# Patient Record
Sex: FEMALE | Race: WHITE | HISPANIC OR LATINO | Employment: UNEMPLOYED | URBAN - METROPOLITAN AREA
[De-identification: names, ages, dates, MRNs, and addresses within clinical notes are randomized per-mention and may not be internally consistent; named-entity substitution may affect disease eponyms.]

---

## 2020-11-30 ENCOUNTER — OFFICE VISIT (OUTPATIENT)
Dept: URGENT CARE | Facility: MEDICAL CENTER | Age: 25
End: 2020-11-30
Payer: COMMERCIAL

## 2020-11-30 VITALS — HEART RATE: 78 BPM | TEMPERATURE: 96.6 F | RESPIRATION RATE: 18 BRPM | OXYGEN SATURATION: 97 %

## 2020-11-30 DIAGNOSIS — B34.9 VIRAL SYNDROME: ICD-10-CM

## 2020-11-30 DIAGNOSIS — Z20.822 ENCOUNTER FOR LABORATORY TESTING FOR COVID-19 VIRUS: Primary | ICD-10-CM

## 2020-11-30 PROCEDURE — 87637 SARSCOV2&INF A&B&RSV AMP PRB: CPT | Performed by: PHYSICIAN ASSISTANT

## 2020-11-30 PROCEDURE — 99204 OFFICE O/P NEW MOD 45 MIN: CPT | Performed by: PHYSICIAN ASSISTANT

## 2020-11-30 PROCEDURE — G0383 LEV 4 HOSP TYPE B ED VISIT: HCPCS | Performed by: PHYSICIAN ASSISTANT

## 2020-11-30 PROCEDURE — 99284 EMERGENCY DEPT VISIT MOD MDM: CPT | Performed by: PHYSICIAN ASSISTANT

## 2020-12-02 LAB
FLUAV RNA NPH QL NAA+PROBE: NOT DETECTED
FLUBV RNA NPH QL NAA+PROBE: NOT DETECTED
RSV RNA NPH QL NAA+PROBE: NOT DETECTED
SARS-COV-2 RNA NPH QL NAA+PROBE: NOT DETECTED

## 2020-12-03 ENCOUNTER — TELEPHONE (OUTPATIENT)
Dept: URGENT CARE | Facility: MEDICAL CENTER | Age: 25
End: 2020-12-03

## 2021-09-23 ENCOUNTER — HOSPITAL ENCOUNTER (EMERGENCY)
Facility: HOSPITAL | Age: 26
Discharge: HOME | End: 2021-09-23
Attending: EMERGENCY MEDICINE | Admitting: EMERGENCY MEDICINE
Payer: COMMERCIAL

## 2021-09-23 ENCOUNTER — APPOINTMENT (EMERGENCY)
Dept: ULTRASOUND IMAGING | Facility: HOSPITAL | Age: 26
End: 2021-09-23
Payer: COMMERCIAL

## 2021-09-23 VITALS
SYSTOLIC BLOOD PRESSURE: 103 MMHG | RESPIRATION RATE: 16 BRPM | HEART RATE: 66 BPM | OXYGEN SATURATION: 100 % | DIASTOLIC BLOOD PRESSURE: 65 MMHG | WEIGHT: 110.89 LBS | TEMPERATURE: 98.3 F

## 2021-09-23 DIAGNOSIS — N93.9 VAGINAL BLEEDING: ICD-10-CM

## 2021-09-23 DIAGNOSIS — Z87.59 HISTORY OF MISCARRIAGE: ICD-10-CM

## 2021-09-23 DIAGNOSIS — R10.2 PELVIC PAIN: Primary | ICD-10-CM

## 2021-09-23 LAB
ABO GROUP BLD: NORMAL
ANION GAP SERPL CALCULATED.3IONS-SCNC: 8 MMOL/L (ref 4–13)
B-HCG SERPL-ACNC: <2 MIU/ML
BASOPHILS # BLD AUTO: 0.04 THOUSANDS/ΜL (ref 0–0.1)
BASOPHILS NFR BLD AUTO: 1 % (ref 0–1)
BILIRUB UR QL STRIP: NEGATIVE
BILIRUB UR QL STRIP: NEGATIVE
BUN SERPL-MCNC: 8 MG/DL (ref 5–25)
CALCIUM SERPL-MCNC: 8.8 MG/DL (ref 8.3–10.1)
CHLORIDE SERPL-SCNC: 105 MMOL/L (ref 100–108)
CLARITY UR: CLEAR
CLARITY UR: NORMAL
CO2 SERPL-SCNC: 25 MMOL/L (ref 21–32)
COLOR UR: YELLOW
COLOR UR: YELLOW
CREAT SERPL-MCNC: 0.67 MG/DL (ref 0.6–1.3)
EOSINOPHIL # BLD AUTO: 0.26 THOUSAND/ΜL (ref 0–0.61)
EOSINOPHIL NFR BLD AUTO: 4 % (ref 0–6)
ERYTHROCYTE [DISTWIDTH] IN BLOOD BY AUTOMATED COUNT: 12.4 % (ref 11.6–15.1)
EXT PREG TEST URINE: NEGATIVE
EXT. CONTROL ED NAV: NORMAL
GFR SERPL CREATININE-BSD FRML MDRD: 123 ML/MIN/1.73SQ M
GLUCOSE SERPL-MCNC: 74 MG/DL (ref 65–140)
GLUCOSE UR STRIP-MCNC: NEGATIVE MG/DL
GLUCOSE UR STRIP-MCNC: NEGATIVE MG/DL
HCT VFR BLD AUTO: 37.8 % (ref 34.8–46.1)
HGB BLD-MCNC: 12.6 G/DL (ref 11.5–15.4)
HGB UR QL STRIP.AUTO: NEGATIVE
HGB UR QL STRIP.AUTO: NEGATIVE
IMM GRANULOCYTES # BLD AUTO: 0.02 THOUSAND/UL (ref 0–0.2)
IMM GRANULOCYTES NFR BLD AUTO: 0 % (ref 0–2)
KETONES UR STRIP-MCNC: ABNORMAL MG/DL
KETONES UR STRIP-MCNC: NEGATIVE MG/DL
LEUKOCYTE ESTERASE UR QL STRIP: NEGATIVE
LEUKOCYTE ESTERASE UR QL STRIP: NEGATIVE
LYMPHOCYTES # BLD AUTO: 1.44 THOUSANDS/ΜL (ref 0.6–4.47)
LYMPHOCYTES NFR BLD AUTO: 22 % (ref 14–44)
MCH RBC QN AUTO: 29.4 PG (ref 26.8–34.3)
MCHC RBC AUTO-ENTMCNC: 33.3 G/DL (ref 31.4–37.4)
MCV RBC AUTO: 88 FL (ref 82–98)
MONOCYTES # BLD AUTO: 0.52 THOUSAND/ΜL (ref 0.17–1.22)
MONOCYTES NFR BLD AUTO: 8 % (ref 4–12)
NEUTROPHILS # BLD AUTO: 4.26 THOUSANDS/ΜL (ref 1.85–7.62)
NEUTS SEG NFR BLD AUTO: 65 % (ref 43–75)
NITRITE UR QL STRIP: NEGATIVE
NITRITE UR QL STRIP: NEGATIVE
NRBC BLD AUTO-RTO: 0 /100 WBCS
PH UR STRIP.AUTO: 6 [PH] (ref 4.5–8)
PH UR STRIP.AUTO: 7.5 [PH]
PLATELET # BLD AUTO: 217 THOUSANDS/UL (ref 149–390)
PMV BLD AUTO: 10.9 FL (ref 8.9–12.7)
POTASSIUM SERPL-SCNC: 4.2 MMOL/L (ref 3.5–5.3)
PROT UR STRIP-MCNC: NEGATIVE MG/DL
PROT UR STRIP-MCNC: NEGATIVE MG/DL
RBC # BLD AUTO: 4.28 MILLION/UL (ref 3.81–5.12)
RH BLD: POSITIVE
SODIUM SERPL-SCNC: 138 MMOL/L (ref 136–145)
SP GR UR STRIP.AUTO: 1.02 (ref 1–1.03)
SP GR UR STRIP.AUTO: 1.02 (ref 1–1.03)
UROBILINOGEN UR QL STRIP.AUTO: 0.2 E.U./DL
UROBILINOGEN UR QL STRIP.AUTO: 0.2 E.U./DL
WBC # BLD AUTO: 6.54 THOUSAND/UL (ref 4.31–10.16)

## 2021-09-23 PROCEDURE — 85025 COMPLETE CBC W/AUTO DIFF WBC: CPT | Performed by: PHYSICIAN ASSISTANT

## 2021-09-23 PROCEDURE — 86901 BLOOD TYPING SEROLOGIC RH(D): CPT | Performed by: PHYSICIAN ASSISTANT

## 2021-09-23 PROCEDURE — 99284 EMERGENCY DEPT VISIT MOD MDM: CPT

## 2021-09-23 PROCEDURE — 84702 CHORIONIC GONADOTROPIN TEST: CPT | Performed by: PHYSICIAN ASSISTANT

## 2021-09-23 PROCEDURE — 81003 URINALYSIS AUTO W/O SCOPE: CPT | Performed by: PHYSICIAN ASSISTANT

## 2021-09-23 PROCEDURE — 80048 BASIC METABOLIC PNL TOTAL CA: CPT | Performed by: PHYSICIAN ASSISTANT

## 2021-09-23 PROCEDURE — 87086 URINE CULTURE/COLONY COUNT: CPT

## 2021-09-23 PROCEDURE — 99285 EMERGENCY DEPT VISIT HI MDM: CPT | Performed by: PHYSICIAN ASSISTANT

## 2021-09-23 PROCEDURE — 81003 URINALYSIS AUTO W/O SCOPE: CPT

## 2021-09-23 PROCEDURE — 76856 US EXAM PELVIC COMPLETE: CPT

## 2021-09-23 PROCEDURE — 86900 BLOOD TYPING SEROLOGIC ABO: CPT | Performed by: PHYSICIAN ASSISTANT

## 2021-09-23 PROCEDURE — 81025 URINE PREGNANCY TEST: CPT | Performed by: EMERGENCY MEDICINE

## 2021-09-23 PROCEDURE — 87086 URINE CULTURE/COLONY COUNT: CPT | Performed by: PHYSICIAN ASSISTANT

## 2021-09-23 PROCEDURE — 76830 TRANSVAGINAL US NON-OB: CPT

## 2021-09-23 PROCEDURE — 36415 COLL VENOUS BLD VENIPUNCTURE: CPT | Performed by: PHYSICIAN ASSISTANT

## 2021-09-23 NOTE — ED PROVIDER NOTES
History  Chief Complaint   Patient presents with    Abdominal Pain Pregnant     pt reports 2 positive pregnancy tests at home  started with lower abdominal pain and bleeding   had a miscarriage 6 months ago so concerned for same  Patient is a 52-year-old female presenting to the ED for evaluation of lower abdominal cramping and possible pregnancy  Patient took 2 home pregnancy tests last Friday that report positive  Yesterday she started having lower abdominal cramping primarily over left lower quadrant and suprapubic region  She went to the bathroom and passed a large clot that she reports looked like products of conception  She had a small amount of bleeding after this has not had any vaginal bleeding since that time  She is still having pelvic pain  She states that she had a miscarriage 6 months ago and is concerned that she is having another  She denies fevers, chills, nausea, vomiting, chest pain, sob, diarrhea, constipation or urinary symptoms  No vaginal discharge, pain or lesions  LMP was mid to end of July  None       History reviewed  No pertinent past medical history  History reviewed  No pertinent surgical history  History reviewed  No pertinent family history  I have reviewed and agree with the history as documented  E-Cigarette/Vaping     E-Cigarette/Vaping Substances     Social History     Tobacco Use    Smoking status: Never Smoker    Smokeless tobacco: Never Used   Substance Use Topics    Alcohol use: Not Currently    Drug use: Never       Review of Systems   Constitutional: Negative for appetite change, chills, fatigue and fever  HENT: Negative for congestion, rhinorrhea, sinus pressure, sinus pain and sore throat  Eyes: Negative for photophobia and visual disturbance  Respiratory: Negative for cough, shortness of breath and wheezing  Cardiovascular: Negative for chest pain, palpitations and leg swelling     Gastrointestinal: Negative for abdominal pain, blood in stool, constipation, diarrhea, nausea and vomiting  Genitourinary: Positive for pelvic pain and vaginal bleeding  Negative for difficulty urinating, dysuria, flank pain, frequency, hematuria and urgency  Musculoskeletal: Negative for arthralgias, back pain, joint swelling, myalgias and neck pain  Neurological: Negative for dizziness, syncope, weakness, light-headedness and headaches  All other systems reviewed and are negative  Physical Exam  Physical Exam  Vitals and nursing note reviewed  Constitutional:       General: She is awake  Appearance: Normal appearance  She is well-developed  She is not toxic-appearing or diaphoretic  HENT:      Head: Normocephalic and atraumatic  Right Ear: External ear normal       Left Ear: External ear normal       Nose: Nose normal       Mouth/Throat:      Lips: Pink  Mouth: Mucous membranes are moist    Eyes:      General: Lids are normal  No scleral icterus  Conjunctiva/sclera: Conjunctivae normal       Pupils: Pupils are equal, round, and reactive to light  Cardiovascular:      Rate and Rhythm: Normal rate and regular rhythm  Pulses: Normal pulses  Radial pulses are 2+ on the right side and 2+ on the left side  Heart sounds: Normal heart sounds, S1 normal and S2 normal    Pulmonary:      Effort: Pulmonary effort is normal  No accessory muscle usage  Breath sounds: Normal breath sounds  No stridor  No decreased breath sounds, wheezing, rhonchi or rales  Abdominal:      General: Abdomen is flat  Bowel sounds are normal  There is no distension  Palpations: Abdomen is soft  Tenderness: There is abdominal tenderness in the suprapubic area and left lower quadrant  There is no right CVA tenderness, left CVA tenderness, guarding or rebound  Negative signs include Daily's sign, Rovsing's sign, McBurney's sign, psoas sign and obturator sign     Musculoskeletal:      Cervical back: Full passive range of motion without pain and neck supple  No signs of trauma  No pain with movement  Right lower leg: No edema  Left lower leg: No edema  Lymphadenopathy:      Cervical: No cervical adenopathy  Skin:     General: Skin is warm and dry  Capillary Refill: Capillary refill takes less than 2 seconds  Coloration: Skin is not cyanotic, jaundiced or pale  Neurological:      Mental Status: She is alert and oriented to person, place, and time  GCS: GCS eye subscore is 4  GCS verbal subscore is 5  GCS motor subscore is 6  Gait: Gait normal    Psychiatric:         Mood and Affect: Mood normal          Speech: Speech normal          Behavior: Behavior is cooperative           Vital Signs  ED Triage Vitals [09/23/21 1009]   Temperature Pulse Respirations Blood Pressure SpO2   98 3 °F (36 8 °C) 87 16 122/63 99 %      Temp Source Heart Rate Source Patient Position - Orthostatic VS BP Location FiO2 (%)   Oral -- -- -- --      Pain Score       Worst Possible Pain           Vitals:    09/23/21 1009 09/23/21 1316   BP: 122/63 103/65   Pulse: 87 66         Visual Acuity      ED Medications  Medications - No data to display    Diagnostic Studies  Results Reviewed     Procedure Component Value Units Date/Time    Urine culture [[id number]] Collected: 09/23/21 1140    Lab Status: Final result Specimen: Urine, Clean Catch Updated: 09/24/21 1156     Urine Culture No Growth <1000 cfu/mL    Urine culture [[id number]] Collected: 09/23/21 1100    Lab Status: Final result Specimen: Urine, Clean Catch Updated: 09/24/21 1156     Urine Culture No Growth <1000 cfu/mL    UA w Reflex to Microscopic w Reflex to Culture [[id number]] Collected: 09/23/21 1140    Lab Status: Final result Specimen: Urine, Clean Catch Updated: 09/23/21 1208     Color, UA Yellow     Clarity, UA Cloudy     Specific Dawn, UA 1 020     pH, UA 7 5     Leukocytes, UA Negative     Nitrite, UA Negative     Protein, UA Negative mg/dl      Glucose, UA Negative mg/dl      Ketones, UA Negative mg/dl      Urobilinogen, UA 0 2 E U /dl      Bilirubin, UA Negative     Blood, UA Negative     URINE COMMENT --    Basic metabolic panel [[id number]] Collected: 09/23/21 1140    Lab Status: Final result Specimen: Blood from Arm, Right Updated: 09/23/21 1207     Sodium 138 mmol/L      Potassium 4 2 mmol/L      Chloride 105 mmol/L      CO2 25 mmol/L      ANION GAP 8 mmol/L      BUN 8 mg/dL      Creatinine 0 67 mg/dL      Glucose 74 mg/dL      Calcium 8 8 mg/dL      eGFR 123 ml/min/1 73sq m     Narrative:      [facility] guidelines for Chronic Kidney Disease (CKD):     Stage 1 with normal or high GFR (GFR > 90 mL/min/1 73 square meters)    Stage 2 Mild CKD (GFR = 60-89 mL/min/1 73 square meters)    Stage 3A Moderate CKD (GFR = 45-59 mL/min/1 73 square meters)    Stage 3B Moderate CKD (GFR = 30-44 mL/min/1 73 square meters)    Stage 4 Severe CKD (GFR = 15-29 mL/min/1 73 square meters)    Stage 5 End Stage CKD (GFR <15 mL/min/1 73 square meters)  Note: GFR calculation is accurate only with a steady state creatinine    hCG, quantitative [[id number]]  (Normal) Collected: 09/23/21 1140    Lab Status: Final result Specimen: Blood from Arm, Right Updated: 09/23/21 1207     HCG, Quant <2 mIU/mL     Narrative:       Expected Ranges:     Approximate               Approximate HCG  Gestation age          Concentration ( mIU/mL)  _____________          ______________________   Dorothy Philip                      HCG values  0 2-1                       5-50  1-2                           2-3                         100-5000  3-4                         500-87215  4-5                         1000-49499  5-6                         [id number]  6-8                         [id number]  8-12                        [id number]      CBC and differential [[id number]] Collected: 09/23/21 1140    Lab Status: Final result Specimen: Blood from Arm, Right Updated: 09/23/21 1147 WBC 6 54 Thousand/uL      RBC 4 28 Million/uL      Hemoglobin 12 6 g/dL      Hematocrit 37 8 %      MCV 88 fL      MCH 29 4 pg      MCHC 33 3 g/dL      RDW 12 4 %      MPV 10 9 fL      Platelets 142 Thousands/uL      nRBC 0 /100 WBCs      Neutrophils Relative 65 %      Immat GRANS % 0 %      Lymphocytes Relative 22 %      Monocytes Relative 8 %      Eosinophils Relative 4 %      Basophils Relative 1 %      Neutrophils Absolute 4 26 Thousands/µL      Immature Grans Absolute 0 02 Thousand/uL      Lymphocytes Absolute 1 44 Thousands/µL      Monocytes Absolute 0 52 Thousand/µL      Eosinophils Absolute 0 26 Thousand/µL      Basophils Absolute 0 04 Thousands/µL     POCT pregnancy, urine [[id number]]  (Normal) Resulted: 09/23/21 1107    Lab Status: Final result Updated: 09/23/21 1107     EXT PREG TEST UR (Ref: Negative) NEGATIVE     Control VALID    Urine Macroscopic, POC [[id number]]  (Abnormal) Collected: 09/23/21 1100    Lab Status: Final result Specimen: Urine Updated: 09/23/21 1103     Color, UA Yellow     Clarity, UA Clear     pH, UA 6 0     Leukocytes, UA Negative     Nitrite, UA Negative     Protein, UA Negative mg/dl      Glucose, UA Negative mg/dl      Ketones, UA Trace mg/dl      Urobilinogen, UA 0 2 E U /dl      Bilirubin, UA Negative     Blood, UA Negative     Specific Gravity, UA 1 025    Narrative:      CLINITEK RESULT                 US pelvis complete w transvaginal   Final Result by Lisa Liu MD (09/23 1325)       No IUP  No suspicious adnexal masses  Small amount of free fluid  Corpus luteum on the right  Workstation performed: [id number]                    Procedures  Procedures         ED Course                             SBIRT 22yo+      Most Recent Value   SBIRT (25 yo +)   In order to provide better care to our patients, we are screening all of our patients for alcohol and drug use  Would it be okay to ask you these screening questions?   Yes Filed at: 09/23/2021 1054   Initial Alcohol Screen: US AUDIT-C    1  How often do you have a drink containing alcohol?  0 Filed at: 09/23/2021 1054   2  How many drinks containing alcohol do you have on a typical day you are drinking? 0 Filed at: 09/23/2021 1054   3b  FEMALE Any Age, or MALE 65+: How often do you have 4 or more drinks on one occassion? 0 Filed at: 09/23/2021 1054   Audit-C Score  0 Filed at: 09/23/2021 1054   TATA: How many times in the past year have you    Used an illegal drug or used a prescription medication for non-medical reasons? Never Filed at: 09/23/2021 1054                    MDM  Number of Diagnoses or Management Options  History of miscarriage  Pelvic pain  Vaginal bleeding  Diagnosis management comments: Patient is a 67-year-old female presenting to the ED for evaluation of lower abdominal cramping and possible pregnancy  Negative pregnancy test in ED  Patient showed a photo of the large clot that she passed and it did appear to be possible products of conception  Pelvic U/S normal; no torsion  Labs unremarkable  Advised patient to follow-up with her OBGYN and/or return to ED for worsening symptoms  The management plan was discussed in detail with the patient at bedside and all questions were answered  Prior to discharge, verbal and written instructions provided  ED return precautions discussed in detail  The patient verbalized understanding of our discussion and plan of care, and agrees to return to the Emergency Department for concerns and progression of illness         Amount and/or Complexity of Data Reviewed  Clinical lab tests: ordered and reviewed  Tests in the radiology section of CPT®: ordered and reviewed    Patient Progress  Patient progress: stable      Disposition  Final diagnoses:   Pelvic pain   Vaginal bleeding   History of miscarriage     Time reflects when diagnosis was documented in both MDM as applicable and the Disposition within this note     Time User Action Codes Description Comment    9/23/2021  1:32 PM Reina Hayden Add [R10 2] Pelvic pain     9/23/2021  1:32 PM Reina Hayden Add [N93 9] Vaginal bleeding     9/23/2021  1:33 PM Reina Hayden Add [Z87 59] Hx of one miscarriage     9/23/2021  1:33 PM Reina Hayden Remove [Z87 59] Hx of one miscarriage     9/23/2021  1:33 PM Asha Green Add [Z87 59] History of miscarriage       ED Disposition     ED Disposition Condition Date/Time Comment    Discharge Stable Thu Sep 23, 2021  1:32 PM Anca Lester discharge to home/self care  Follow-up Information     Follow up With Specialties Details Why Contact Info Additional [facility] Schedule an appointment as soon as possible for a visit   [address]  [id number] [place] [address], [place], [place], [address]   [facility] Emergency Department Emergency Medicine  If symptoms worsen [facility] [id number]  [address] [place] [address] Emergency Department, [address] [place] [address], [id number]          There are no discharge medications for this patient          PDMP Review     None          ED Provider  Electronically Signed by           Dot Ngo PA-C  09/24/21 2039

## 2021-09-24 LAB
BACTERIA UR CULT: NORMAL
BACTERIA UR CULT: NORMAL

## 2021-10-21 ENCOUNTER — HOSPITAL ENCOUNTER (EMERGENCY)
Facility: HOSPITAL | Age: 26
Discharge: HOME | End: 2021-10-21
Attending: EMERGENCY MEDICINE | Admitting: EMERGENCY MEDICINE
Payer: COMMERCIAL

## 2021-10-21 VITALS
OXYGEN SATURATION: 100 % | SYSTOLIC BLOOD PRESSURE: 106 MMHG | DIASTOLIC BLOOD PRESSURE: 69 MMHG | RESPIRATION RATE: 18 BRPM | HEART RATE: 78 BPM | WEIGHT: 110 LBS | TEMPERATURE: 97.1 F

## 2021-10-21 DIAGNOSIS — O21.9 VOMITING AFFECTING PREGNANCY: ICD-10-CM

## 2021-10-21 DIAGNOSIS — R19.7 DIARRHEA: Primary | ICD-10-CM

## 2021-10-21 PROCEDURE — 99283 EMERGENCY DEPT VISIT LOW MDM: CPT

## 2021-10-21 PROCEDURE — NC001 PR NO CHARGE: Performed by: EMERGENCY MEDICINE

## 2021-10-21 RX ORDER — ONDANSETRON 4 MG/1
4 TABLET, FILM COATED ORAL EVERY 8 HOURS PRN
Qty: 12 TABLET | Refills: 0 | Status: SHIPPED | OUTPATIENT
Start: 2021-10-21 | End: 2021-10-25

## 2021-11-29 ENCOUNTER — NURSE TRIAGE (OUTPATIENT)
Dept: OTHER | Facility: OTHER | Age: 26
End: 2021-11-29

## 2022-01-20 ENCOUNTER — HOSPITAL ENCOUNTER (EMERGENCY)
Facility: HOSPITAL | Age: 27
Discharge: HOME | End: 2022-01-20
Attending: EMERGENCY MEDICINE | Admitting: EMERGENCY MEDICINE
Payer: COMMERCIAL

## 2022-01-20 VITALS
HEART RATE: 77 BPM | WEIGHT: 110.67 LBS | OXYGEN SATURATION: 100 % | SYSTOLIC BLOOD PRESSURE: 107 MMHG | RESPIRATION RATE: 16 BRPM | DIASTOLIC BLOOD PRESSURE: 71 MMHG | TEMPERATURE: 98.3 F

## 2022-01-20 DIAGNOSIS — R53.1 GENERALIZED WEAKNESS: Primary | ICD-10-CM

## 2022-01-20 LAB
ALBUMIN SERPL BCP-MCNC: 4.6 G/DL (ref 3–5.2)
ALP SERPL-CCNC: 58 U/L (ref 43–122)
ALT SERPL W P-5'-P-CCNC: 8 U/L
ANION GAP SERPL CALCULATED.3IONS-SCNC: 8 MMOL/L (ref 5–14)
AST SERPL W P-5'-P-CCNC: 16 U/L (ref 14–36)
ATRIAL RATE: 73 BPM
BASOPHILS # BLD AUTO: 0.07 THOUSAND/UL (ref 0–0.1)
BASOPHILS NFR MAR MANUAL: 1 % (ref 0–1)
BILIRUB SERPL-MCNC: 0.44 MG/DL
BUN SERPL-MCNC: 6 MG/DL (ref 5–25)
CALCIUM SERPL-MCNC: 9.2 MG/DL (ref 8.4–10.2)
CHLORIDE SERPL-SCNC: 103 MMOL/L (ref 97–108)
CO2 SERPL-SCNC: 30 MMOL/L (ref 22–30)
CREAT SERPL-MCNC: 0.54 MG/DL (ref 0.6–1.2)
EOSINOPHIL # BLD AUTO: 0.07 THOUSAND/UL (ref 0–0.4)
EOSINOPHIL NFR BLD MANUAL: 1 % (ref 0–6)
ERYTHROCYTE [DISTWIDTH] IN BLOOD BY AUTOMATED COUNT: 13.6 %
EXT PREG TEST URINE: NORMAL
EXT. CONTROL ED NAV: NORMAL
GFR SERPL CREATININE-BSD FRML MDRD: 131 ML/MIN/1.73SQ M
GLUCOSE SERPL-MCNC: 103 MG/DL (ref 70–99)
HCT VFR BLD AUTO: 39.1 % (ref 36–46)
HGB BLD-MCNC: 12.8 G/DL (ref 12–16)
LYMPHOCYTES # BLD AUTO: 2 THOUSAND/UL (ref 0.5–4)
LYMPHOCYTES # BLD AUTO: 29 % (ref 25–45)
MCH RBC QN AUTO: 28 PG (ref 26–34)
MCHC RBC AUTO-ENTMCNC: 32.7 G/DL (ref 31–36)
MCV RBC AUTO: 86 FL (ref 80–100)
MONOCYTES # BLD AUTO: 0.28 THOUSAND/UL (ref 0.2–0.9)
MONOCYTES NFR BLD AUTO: 4 % (ref 1–10)
MYELOCYTES NFR BLD MANUAL: 3 % (ref 0–1)
NEUTS BAND NFR BLD MANUAL: 2 % (ref 0–8)
NEUTS SEG # BLD: 4.28 THOUSAND/UL (ref 1.8–7.8)
NEUTS SEG NFR BLD AUTO: 60 %
P AXIS: 55 DEGREES
PLATELET # BLD AUTO: 234 THOUSANDS/UL (ref 150–450)
PLATELET BLD QL SMEAR: ADEQUATE
PMV BLD AUTO: 9.6 FL (ref 8.9–12.7)
POTASSIUM SERPL-SCNC: 3.6 MMOL/L (ref 3.6–5)
PR INTERVAL: 130 MS
PROT SERPL-MCNC: 8.6 G/DL (ref 5.9–8.4)
QRS AXIS: 79 DEGREES
QRSD INTERVAL: 82 MS
QT INTERVAL: 382 MS
QTC INTERVAL: 420 MS
RBC # BLD AUTO: 4.58 MILLION/UL (ref 4–5.2)
RBC MORPH BLD: NORMAL
SODIUM SERPL-SCNC: 141 MMOL/L (ref 137–147)
T WAVE AXIS: 72 DEGREES
TOTAL CELLS COUNTED SPEC: 100
VENTRICULAR RATE: 73 BPM
WBC # BLD AUTO: 6.9 THOUSAND/UL (ref 4.5–11)

## 2022-01-20 PROCEDURE — 99285 EMERGENCY DEPT VISIT HI MDM: CPT | Performed by: PHYSICIAN ASSISTANT

## 2022-01-20 PROCEDURE — 93005 ELECTROCARDIOGRAM TRACING: CPT

## 2022-01-20 PROCEDURE — 81025 URINE PREGNANCY TEST: CPT | Performed by: PHYSICIAN ASSISTANT

## 2022-01-20 PROCEDURE — 96361 HYDRATE IV INFUSION ADD-ON: CPT

## 2022-01-20 PROCEDURE — 99284 EMERGENCY DEPT VISIT MOD MDM: CPT

## 2022-01-20 PROCEDURE — 85007 BL SMEAR W/DIFF WBC COUNT: CPT | Performed by: PHYSICIAN ASSISTANT

## 2022-01-20 PROCEDURE — 80053 COMPREHEN METABOLIC PANEL: CPT | Performed by: PHYSICIAN ASSISTANT

## 2022-01-20 PROCEDURE — 85027 COMPLETE CBC AUTOMATED: CPT | Performed by: PHYSICIAN ASSISTANT

## 2022-01-20 PROCEDURE — 93010 ELECTROCARDIOGRAM REPORT: CPT | Performed by: INTERNAL MEDICINE

## 2022-01-20 PROCEDURE — 96374 THER/PROPH/DIAG INJ IV PUSH: CPT

## 2022-01-20 PROCEDURE — 96375 TX/PRO/DX INJ NEW DRUG ADDON: CPT

## 2022-01-20 PROCEDURE — 36415 COLL VENOUS BLD VENIPUNCTURE: CPT | Performed by: PHYSICIAN ASSISTANT

## 2022-01-20 RX ORDER — KETOROLAC TROMETHAMINE 30 MG/ML
15 INJECTION, SOLUTION INTRAMUSCULAR; INTRAVENOUS ONCE
Status: COMPLETED | OUTPATIENT
Start: 2022-01-20 | End: 2022-01-20

## 2022-01-20 RX ORDER — LORAZEPAM 2 MG/ML
0.5 INJECTION INTRAMUSCULAR ONCE
Status: COMPLETED | OUTPATIENT
Start: 2022-01-20 | End: 2022-01-20

## 2022-01-20 RX ADMIN — LORAZEPAM 0.5 MG: 2 INJECTION INTRAMUSCULAR; INTRAVENOUS at 20:43

## 2022-01-20 RX ADMIN — KETOROLAC TROMETHAMINE 15 MG: 30 INJECTION, SOLUTION INTRAMUSCULAR; INTRAVENOUS at 20:43

## 2022-01-20 RX ADMIN — SODIUM CHLORIDE 1000 ML: 0.9 INJECTION, SOLUTION INTRAVENOUS at 20:42

## 2022-01-21 NOTE — ED PROVIDER NOTES
History  Chief Complaint   Patient presents with    Anxiety     Anxiety/panic attack and potential syncopal episode today   Syncope     30-year-old female without significant past medical history presents via EMS complaining of generalized weakness  Patient tells me that she ate dinner and was feeling her normal self and then went to bed began experiencing palpitations generalized weakness and felt like she was going to faint  Denies specific syncopal episode but felt like she was very weak  Denies chest pain but admits palpitations  Denies SOB  Admits to a lot of recent anxiety  Denies any other complaints       History provided by:  Patient   used: No        Prior to Admission Medications   Prescriptions Last Dose Informant Patient Reported? Taking?   ondansetron (ZOFRAN) 4 mg tablet   No No   Sig: Take 1 tablet (4 mg total) by mouth every 8 (eight) hours as needed for nausea or vomiting for up to 4 days      Facility-Administered Medications: None       History reviewed  No pertinent past medical history  History reviewed  No pertinent surgical history  History reviewed  No pertinent family history  I have reviewed and agree with the history as documented  E-Cigarette/Vaping     E-Cigarette/Vaping Substances     Social History     Tobacco Use    Smoking status: Never Smoker    Smokeless tobacco: Never Used   Substance Use Topics    Alcohol use: Not Currently    Drug use: Never       Review of Systems   Constitutional: Negative  Negative for chills and fatigue  Generalized weakness   HENT: Negative for ear pain and sore throat  Eyes: Negative for photophobia and redness  Respiratory: Negative for apnea, cough and shortness of breath  Cardiovascular: Positive for palpitations  Negative for chest pain  Gastrointestinal: Negative for abdominal pain, nausea and vomiting  Genitourinary: Negative for dysuria     Musculoskeletal: Negative for arthralgias, neck pain and neck stiffness  Skin: Negative for rash  Neurological: Negative for dizziness, tremors, syncope and weakness  Psychiatric/Behavioral: Negative for suicidal ideas  The patient is nervous/anxious  Physical Exam  Physical Exam  Constitutional:       General: She is not in acute distress  Appearance: She is well-developed  She is not ill-appearing, toxic-appearing or diaphoretic  Eyes:      Pupils: Pupils are equal, round, and reactive to light  Cardiovascular:      Rate and Rhythm: Normal rate and regular rhythm  Pulmonary:      Effort: Pulmonary effort is normal  No respiratory distress  Breath sounds: Normal breath sounds  Abdominal:      General: Bowel sounds are normal  There is no distension  Palpations: Abdomen is soft  Musculoskeletal:         General: Normal range of motion  Cervical back: Normal range of motion and neck supple  Skin:     General: Skin is warm and dry  Neurological:      General: No focal deficit present  Mental Status: She is alert and oriented to person, place, and time  Cranial Nerves: No cranial nerve deficit           Vital Signs  ED Triage Vitals [01/20/22 2018]   Temperature Pulse Respirations Blood Pressure SpO2   98 3 °F (36 8 °C) 86 16 121/71 100 %      Temp Source Heart Rate Source Patient Position - Orthostatic VS BP Location FiO2 (%)   Oral Monitor Lying Left arm --      Pain Score       --           Vitals:    01/20/22 2018 01/20/22 2118   BP: 121/71 107/71   Pulse: 86 77   Patient Position - Orthostatic VS: Lying Lying         Visual Acuity      ED Medications  Medications   ketorolac (TORADOL) injection 15 mg (15 mg Intravenous Given 1/20/22 2043)   sodium chloride 0 9 % bolus 1,000 mL (0 mL Intravenous Stopped 1/20/22 2129)   LORazepam (ATIVAN) injection 0 5 mg (0 5 mg Intravenous Given 1/20/22 2043)       Diagnostic Studies  Results Reviewed     Procedure Component Value Units Date/Time    Manual Differential (Non [facility]) [[id number]]  (Abnormal) Collected: 01/20/22 2042    Lab Status: Final result Specimen: Blood from Arm, Right Updated: 01/20/22 2128     Segmented % 60 %      Bands % 2 %      Lymphocytes % 29 %      Monocytes % 4 %      Eosinophils, % 1 %      Basophils % 1 %      Myelocytes % 3 %      Neutrophils Absolute 4 28 Thousand/uL      Lymphocytes Absolute 2 00 Thousand/uL      Monocytes Absolute 0 28 Thousand/uL      Eosinophils Absolute 0 07 Thousand/uL      Basophils Absolute 0 07 Thousand/uL      Total Counted 100     RBC Morphology Normal     Platelet Estimate Adequate    Comprehensive metabolic panel [[id number]]  (Abnormal) Collected: 01/20/22 2042    Lab Status: Final result Specimen: Blood from Arm, Right Updated: 01/20/22 2101     Sodium 141 mmol/L      Potassium 3 6 mmol/L      Chloride 103 mmol/L      CO2 30 mmol/L      ANION GAP 8 mmol/L      BUN 6 mg/dL      Creatinine 0 54 mg/dL      Glucose 103 mg/dL      Calcium 9 2 mg/dL      AST 16 U/L      ALT 8 U/L      Alkaline Phosphatase 58 U/L      Total Protein 8 6 g/dL      Albumin 4 6 g/dL      Total Bilirubin 0 44 mg/dL      eGFR 131 ml/min/1 73sq m     Narrative:      [facility] guidelines for Chronic Kidney Disease (CKD):     Stage 1 with normal or high GFR (GFR > 90 mL/min/1 73 square meters)    Stage 2 Mild CKD (GFR = 60-89 mL/min/1 73 square meters)    Stage 3A Moderate CKD (GFR = 45-59 mL/min/1 73 square meters)    Stage 3B Moderate CKD (GFR = 30-44 mL/min/1 73 square meters)    Stage 4 Severe CKD (GFR = 15-29 mL/min/1 73 square meters)    Stage 5 End Stage CKD (GFR <15 mL/min/1 73 square meters)  Note: GFR calculation is accurate only with a steady state creatinine    CBC and differential [[id number]]  (Normal) Collected: 01/20/22 2042    Lab Status: Final result Specimen: Blood from Arm, Right Updated: 01/20/22 2054     WBC 6 90 Thousand/uL      RBC 4 58 Million/uL      Hemoglobin 12 8 g/dL      Hematocrit 39 1 %      MCV 86 fL      MCH 28 0 pg      MCHC 32 7 g/dL      RDW 13 6 %      MPV 9 6 fL      Platelets 191 Thousands/uL     POCT pregnancy, urine [[id number]]  (Normal) Resulted: 01/20/22 2038    Lab Status: Final result Updated: 01/20/22 2038     EXT PREG TEST UR (Ref: Negative) neg  Control valid                 No orders to display              Procedures  ECG 12 Lead Documentation Only    Date/Time: 1/20/2022 8:38 PM  Performed by: Gary Adams PA-C  Authorized by: Gary Adams PA-C     Indications / Diagnosis:  Weakness  ECG reviewed by me, the ED Provider: no    Patient location:  ED  Previous ECG:     Comparison to cardiac monitor: Yes    Interpretation:     Interpretation: normal    Rate:     ECG rate:  73    ECG rate assessment: normal    Rhythm:     Rhythm: sinus rhythm    Ectopy:     Ectopy: none    QRS:     QRS axis:  Normal    QRS intervals:  Normal  Conduction:     Conduction: normal    ST segments:     ST segments:  Normal  T waves:     T waves: normal               ED Course                                             MDM  Number of Diagnoses or Management Options  Generalized weakness: new and does not require workup  Diagnosis management comments: Patient had benign evaluation was feeling better following IV hydration  No focal abnormality on laboratory evaluation or physical exam   Symptoms thought to be related to dehydration versus orthostatic hypotension  Patient was educated extensively on supportive care and return precautions and stable for discharge home         Amount and/or Complexity of Data Reviewed  Clinical lab tests: ordered and reviewed    Risk of Complications, Morbidity, and/or Mortality  Presenting problems: moderate  Diagnostic procedures: moderate  Management options: moderate    Patient Progress  Patient progress: stable      Disposition  Final diagnoses:   Generalized weakness     Time reflects when diagnosis was documented in both MDM as applicable and the Disposition within this note     Time User Action Codes Description Comment    1/20/2022  9:11 PM Mina Singer Add [R55] Near syncope     1/20/2022  9:12 PM Mina Singer Remove [R55] Near syncope     1/20/2022  9:13 PM Mina Singer Add [R53 1] Generalized weakness       ED Disposition     ED Disposition Condition Date/Time Comment    Discharge Stable Thu Jan 20, 2022  9:11 PM Kimmy Sánchez discharge to home/self care  Follow-up Information     Follow up With Specialties Details Why Contact Info Additional [address] Heart Emergency Department Emergency Medicine Go to  If symptoms worsen [address] [address] [address] [facility] Heart Emergency Department          Discharge Medication List as of 1/20/2022  9:13 PM      CONTINUE these medications which have NOT CHANGED    Details   ondansetron (ZOFRAN) 4 mg tablet Take 1 tablet (4 mg total) by mouth every 8 (eight) hours as needed for nausea or vomiting for up to 4 days, Starting Thu 10/21/2021, Until Mon 10/25/2021 at 2359, Print             No discharge procedures on file      PDMP Review     None          ED Provider  Electronically Signed by           Shankar Lovelace PA-C  01/20/22 4520

## 2022-02-15 ENCOUNTER — TELEPHONE (OUTPATIENT)
Dept: OBGYN CLINIC | Facility: CLINIC | Age: 27
End: 2022-02-15

## 2022-02-27 NOTE — PROGRESS NOTES
Assessment/Plan:    History of seizures  No previous documentation on chart  Per patient she was diagnosed with seizures in 1 in [facility], and has not been placed on any medications  In Ed January 2022 visit , patient had no witnessed seizure and no diagnosis regarding this matter  Ddx: pseudoseizures, panick attack, worsening anxiety in the setting of recent stressors   Positive family hx with father and sister that have seizures  She's had recent labs in the ED in January, will check new CBC, BMP, Mg, TSH/freeT4 and HbA1c to ensure corinne doesn't have major electrolyte derangements, thyroid disease and diabetes  Will send referral to neurology for further evaluation and management  She will require outpatient EEG    Acute midline low back pain without sciatica  Presentation: acute midline lumbar pain  No red flag symptoms such a loss of urine/bowel  Discussed importance of maintaining daily activity and avoiding sedentary lifestyle which can worsen back pain  Advised patient to use massage, or warm shower prior to stretching  PT referral placed for strengthening core exercises and pain reduction  Additional treatment:   Lidocaine Patches, Motrin as tolerated in conjunction with Tylenol           Diagnoses and all orders for this visit:    Encounter to establish care  Comments:  Healthy lifestyle measures reinforced  Reduce stress and increase hydration daily   Tdap in 2018    History of seizures  -     Ambulatory Referral to Neurology; Future  -     TSH, 3rd generation; Future  -     Basic metabolic panel; Future  -     Magnesium; Future  -     CBC and differential; Future  -     T4, free; Future    Screening for diabetes mellitus  -     HEMOGLOBIN A1C W/ EAG ESTIMATION; Future    Screening for thyroid disorder  -     TSH, 3rd generation;  Future    Screening for HIV (human immunodeficiency virus)  -     HIV 1/2 Antigen/Antibody (4th Generation) w Reflex [facility]; Future    Encounter for hepatitis C screening test for low risk patient  -     Hepatitis C antibody; Future    Mixed headache  Comments:  Tension + migraine HA components  Discussed HA dairy  Naproxen prescribed to take with Tylenol prior to [address] onset  Coffeine intake, sleep schedule & hydration  Orders:  -     naproxen (Naprosyn) 500 mg tablet; Take 1 tablet (500 mg total) by mouth 2 (two) times a day as needed for headaches    Acute midline low back pain without sciatica  -     Ambulatory Referral to Physical Therapy; Future          Subjective:      Patient ID: Clive Chan is a 23 y o  female  HPI       Patient presents today to establish care  PMH: seizures (last seizure in January at home unwitnessed, previous one in 2018)  Patient dx with seizures in 2015 in [place] however she has not been placed on any anti-seizure medications  Medications: none  Allergies: none  Surg Hx: None  Social Hx: Smoker (1 ppd x 8 yrs)  Denies Etoh and illicit drug use  Lives at home with her 3 girls and her   Fam Hx: Sister and father with hx of seizures  Denies any breast or colon cancer hx in the family  Paternal grandparents with hx of diabetes  Father with diabetes  Preventative care: Tdap at 26 yoa    Current Concerns:     1  Headache   Onset: 1 month ago  Location: bilateral temples  Pain: stabbing, feels like a band squeezing her head  Sensibility to light and noise reported  Currently has a mild headache  Usually she rates HA 10/10 when it starts  Over 1 week she has had daily HA  Denies n/v  Admits to some visual disturbance, some blurry vision  She has never had eye appt  Denies recent falls, loss of consciousness or head injuries  She has been experiencing a lot of stress with her daughter who has been in a wheelchiar  Her daughter had surgery recently and she was unable to get rest    She drinks 3 coffee cups/day  Has been taking Advil and Tylenol w/o significant relief       2  Acute Back Pain  Onset: a few weeks back, close to a month  At times she feels some cramping in the legs  Patient recall she fall back in November and lost a pregnancy  Since then she's been having some back pain  Denies tingling/numbness  Denies recent falls, injuries or accidents  She denies lifting heavy things at home  The following portions of the patient's history were reviewed and updated as appropriate: allergies, current medications, past family history, past medical history, past social history, past surgical history and problem list     Review of Systems   Constitutional: Positive for fatigue  Negative for chills and fever  HENT: Negative for trouble swallowing  Eyes: Negative for visual disturbance  Respiratory: Positive for shortness of breath  Negative for cough  Cardiovascular: Negative for chest pain and leg swelling  Gastrointestinal: Negative for abdominal pain, blood in stool, constipation, diarrhea, nausea and vomiting  Genitourinary: Negative for dysuria and hematuria  Musculoskeletal: Positive for arthralgias  Negative for gait problem  Skin: Negative for rash  Neurological: Positive for headaches  Negative for dizziness  Psychiatric/Behavioral: Positive for sleep disturbance  Negative for agitation  Objective:    /68 (BP Location: Left arm, Patient Position: Sitting, Cuff Size: Adult)   Pulse 74   Temp 97 8 °F (36 6 °C) (Temporal)   Resp 19   Ht 5' 5" (1 651 m)   Wt 47 2 kg (104 lb)   LMP 02/18/2022   SpO2 97%   BMI 17 31 kg/m²        Physical Exam  Vitals and nursing note reviewed  Constitutional:       General: She is not in acute distress  Appearance: Normal appearance  She is well-developed  She is not ill-appearing, toxic-appearing or diaphoretic  HENT:      Head: Normocephalic and atraumatic  Nose: Nose normal    Eyes:      General:         Right eye: No discharge  Left eye: No discharge  Extraocular Movements: Extraocular movements intact  Cardiovascular:      Rate and Rhythm: Normal rate and regular rhythm  Heart sounds: Normal heart sounds  Pulmonary:      Effort: Pulmonary effort is normal  No respiratory distress  Breath sounds: Normal breath sounds  Abdominal:      Palpations: Abdomen is soft  Tenderness: There is no abdominal tenderness  Musculoskeletal:         General: Tenderness (Midline, no paraspinal ms tenderness) present  Normal range of motion  Cervical back: Normal range of motion and neck supple  Right lower leg: No edema  Left lower leg: No edema  Comments: Strength 5/5 BLE  Sensation intact BLE  No underlying erythema, edema or signs of infectious process of the lumbar region    Skin:     General: Skin is warm  Findings: No rash  Neurological:      General: No focal deficit present  Mental Status: She is alert and oriented to person, place, and time  Mental status is at baseline  Cranial Nerves: No cranial nerve deficit  Sensory: Sensation is intact  No sensory deficit  Motor: No weakness  Coordination: Coordination normal       Gait: Gait normal       Comments: Romberg negative   Psychiatric:         Mood and Affect: Mood normal  Affect is flat  Behavior: Behavior normal          Thought Content:  Thought content normal          Judgment: Judgment normal

## 2022-02-28 ENCOUNTER — OFFICE VISIT (OUTPATIENT)
Dept: FAMILY MEDICINE CLINIC | Facility: CLINIC | Age: 27
End: 2022-02-28

## 2022-02-28 VITALS
OXYGEN SATURATION: 97 % | HEART RATE: 74 BPM | HEIGHT: 65 IN | DIASTOLIC BLOOD PRESSURE: 68 MMHG | SYSTOLIC BLOOD PRESSURE: 110 MMHG | WEIGHT: 104 LBS | TEMPERATURE: 97.8 F | RESPIRATION RATE: 19 BRPM | BODY MASS INDEX: 17.33 KG/M2

## 2022-02-28 DIAGNOSIS — Z76.89 ENCOUNTER TO ESTABLISH CARE: Primary | ICD-10-CM

## 2022-02-28 DIAGNOSIS — Z13.29 SCREENING FOR THYROID DISORDER: ICD-10-CM

## 2022-02-28 DIAGNOSIS — Z11.59 ENCOUNTER FOR HEPATITIS C SCREENING TEST FOR LOW RISK PATIENT: ICD-10-CM

## 2022-02-28 DIAGNOSIS — Z13.1 SCREENING FOR DIABETES MELLITUS: ICD-10-CM

## 2022-02-28 DIAGNOSIS — M54.50 ACUTE MIDLINE LOW BACK PAIN WITHOUT SCIATICA: ICD-10-CM

## 2022-02-28 DIAGNOSIS — Z87.898 HISTORY OF SEIZURES: ICD-10-CM

## 2022-02-28 DIAGNOSIS — Z11.4 SCREENING FOR HIV (HUMAN IMMUNODEFICIENCY VIRUS): ICD-10-CM

## 2022-02-28 DIAGNOSIS — R51.9 MIXED HEADACHE: ICD-10-CM

## 2022-02-28 PROBLEM — F17.210 CIGARETTE NICOTINE DEPENDENCE WITHOUT COMPLICATION: Status: ACTIVE | Noted: 2022-02-28

## 2022-02-28 PROCEDURE — 99203 OFFICE O/P NEW LOW 30 MIN: CPT | Performed by: FAMILY MEDICINE

## 2022-02-28 RX ORDER — NAPROXEN 500 MG/1
500 TABLET ORAL 2 TIMES DAILY PRN
Qty: 60 TABLET | Refills: 0 | Status: SHIPPED | OUTPATIENT
Start: 2022-02-28

## 2022-02-28 NOTE — ASSESSMENT & PLAN NOTE
Presentation: acute midline lumbar pain  No red flag symptoms such a loss of urine/bowel  Discussed importance of maintaining daily activity and avoiding sedentary lifestyle which can worsen back pain  Advised patient to use massage, or warm shower prior to stretching  PT referral placed for strengthening core exercises and pain reduction     Additional treatment:   Lidocaine Patches, Motrin as tolerated in conjunction with Tylenol

## 2022-02-28 NOTE — ASSESSMENT & PLAN NOTE
No previous documentation on chart  Per patient she was diagnosed with seizures in 1 in [facility], and has not been placed on any medications  In Ed January 2022 visit , patient had no witnessed seizure and no diagnosis regarding this matter  Ddx: pseudoseizures, panick attack, worsening anxiety in the setting of recent stressors   Positive family hx with father and sister that have seizures  She's had recent labs in the ED in January, will check new CBC, BMP, Mg, TSH/freeT4 and HbA1c to ensure corinne doesn't have major electrolyte derangements, thyroid disease and diabetes  Will send referral to neurology for further evaluation and management   She will require outpatient EEG

## 2022-02-28 NOTE — PATIENT INSTRUCTIONS
Compra Lidocaine patches para la espalda baja  Va  A la terapia fizica     Lifestyle Medicine Tip Sheet- Marshallese    1  Coma alimentos predominantemente menos procesados, brigido comida rápida, cenas de televisión y tocino  2  Coma cerca de la naturaleza (mercados de agricultores, productos frescos o congelados)    3  Coma kofi dieta predominantemente basada en plantas  a  Verdes frondosos oscuros alea   b  Frutas y vegetales  c   Granos integrales: annabella integral, apenas, bayas de annabella, quinua, amirah cortada en khanh, arroz integral, pasta integral   d  Legumbres: frijoles, frijoles pintos, frijoles blancos, frijoles negros, garbanzos (garbanzos), frijoles lima (maduros, secos), arvejas, lentejas y edamame (frijoles de soya verdes)      4  Al menos la mitad del plato debe contener frutas o verduras  5  El líquido debe ser predominantemente agua (limite el refresco y [place])    6  Sonia el tamaño de la porción  7  ¿Qué alimentos jackeline evitar o limitar? - Grasas: Específicamente saturadas y grasas trans  Se encuentran en margarinas, muchas comidas rápidas y algunos productos horneados comprados en la rasheed  Las grasas saturadas y grasas trans pueden elevar olguin nivel de colesterol y olguin probabilidad de contraer enfermedades cardíacas  - Cuando cocine, es mejor no usar aceites, ben si es necesario, trate de limitar la cantidad de aceite utilizado, ya que el aceite contiene muchas calorías por volumen y es muy poco saludable cuando se calienta basilio la cocción   - Azúcar: limite o evite el azúcar, los dulces y los granos refinados  Los granos refinados se encuentran en el pan garcia, el arroz garcia, la mayoría de las formas de pasta y la mayoría de los alimentos "aperitivos" envasados  - Intente no cocinar con rosy y evite agregar rosy adicional a angela comidas    - Sixto Barlow: los estudios tarango demostrado que comer mucha martine [address] el riesgo de ciertos problemas de Malachi Garcia cardíacas y cáncer  8  7-9 horas de sueño    9  Ejercicio diario mínimo de 30 minutos (caminando alrededor de la j luis)    10  Socialización (amigos y familiares)    - Explora tu vecindario  Ve al parque, pasa tiempo en la biblioteca  Si está interesado, puede leer más sobre las opciones de alimentos saludables en los siguientes sitios web:  a  [employer]  org  b  Home cooking recipes: https://Qritiqr/  c  http://Trulia info/  d  [employer]  org      Dolor de cabeça aguda   O QUE VOCÊ PRECISA SABER:   Olive odilia de cabeça aguda é olive odilia ou desconforto que pode começar de forma súbita e piorar rapidamente  Você pode ter olive odilia de cabeça aguda somente quando sentir estresse ou comer certos alimentos  Outras dores de cabeça agudas podem acontecer todos os shah e, às vezes, várias vezes ao kaleb  INSTRUÇÕES APÓS A LUCIA:   Volte à emergência se:  · Você sentir odilia intensa  · Você tiver dormência em um lado do rosto ou do corpo  · Você tiver olive odilia de cabeça após olive pancada na cabeça, olive queda ou outro trauma  · Você tiver olive odilia de cabeça, estiver esquecidos ou confuso, ou tiver dificuldade para falar  · Você tiver olive odilia de cabeça, rigidez do pescoço e febre  Ligue para o seu cuidador se:  · Você tiver odilia de cabeça gareth e estiver vomitando  · Você tiver odilia de cabeça que não melhora todos os shah mesmo depois do tratamento  · Você tiver dores de cabeça diferentes ou se ocorrerem novos sintomas quando você tiver olive odilia de cabeça  · Você tiver dúvidas ou preocupações quanto ao seu problema de saúde ou brigido lidar com arabella  Medicamentos: Você pode precisar de:  · Analgésicos de prescrição médica podem ser Benoit Leong  O medicamento indicado pelo seu médico dependerá do tipo de dores de cabeça que você tem   Você terá que gladys medicamentos para odilia prescritos conforme orientado para evitar um problema chamado odilia de cabeça rebote  Essas dores de cabeça acontecem com o uso regular de analgésicos para as cefaleias  · AINEs , brigido o ibuprofeno, ajudam a diminuir o inchaço, odilia e febre  Yumiko medicamento está disponível com ou alfred receita médica  Os AINEs podem provocar sangramento no estômago ou problemas renais em algumas pessoas  Se você estiver tomando um medicamento anticoagulante, sempre consulte o seu médico para saber se os JONATHAN (medicamentos anti-inflamatórios não esteroides) são seguros para você  Sempre glenna a bula do medicamento e siga as instruções  · Acetaminophen diminui odilia e febre  Arabella está disponível alfred receita médica  Pergunte sobre a dose e a frequência com que deve gladys o medicamento  Siga as instruções  Glenna as bulas de todos os outros medicamentos que [employer] para erin se eles também contêm acetaminofeno ou consulte seu médico ou farmacêutico  O acetaminofeno pode causar danos ao fígado se não for tomado corretamente  Não use mais do que um total de 3 g (3 000 mg) de acetaminofeno em um kaleb  · Antidepressivos podem ser administrados para alguns tipos de dores de cabeça  · [place] seu medicamento conforme orientado  Entre em contato com o seu médico se achar que o medicamento não está ajudando ou se você apresentar efeitos colaterais  Informe a arabella se você for alérgico a algum medicamento  [place]ant[place]ha olive lista de todos os medicamentos, vitaminas e ervas (fitoterápicos) que você harinder  Inclua a quantidade, quando e por que os [place]  Leve a lista ou os frascos de comprimidos às consultas de acompanhamento  Leve sua lista de medicamentos consigo em tesfaye de emergência  Controle os sintomas:  · Faça compressas quentes ou geladas na [address] odilia de Lam Nian  Use olive bolsa térmica ou olive compressa de deepali  Para olive compressa de deepali, você também pode colocar deepali moído em um saco plástico  [place] o pacote ou saco com olive toalha antes de aplicá-lo à sua pele   O deepali e o calor ajudam a diminuir a oidlia, e o calor também ajuda a diminuir espasmos musculares  Aplique a compressa quente basilio 20 a 30 minutos a cada 2 horas  Aplique a compressa de deepali por 15 a 20 minutos a cada hora  Aplique compressas quentes ou de deepali pelo tempo e shah indicados  Você pode alternar entre calor e deepali  · Relaxe os músculos  Deite-se em olive posição confortável e feche os olhos  Relaxe os músculos lentamente  Comece pelos dedos dos pés e [address]  · Mantenha um registro de suas dores de cabeça  Anote quando as dores de cabeça iniciam e corrine  Inclua seus sintomas e o que estava fazendo quando a odilia de cabeça começou  Registre o que você comeu ou bebeu basilio 24 horas antes de começar a odilia de cabeça  Shanique Mar[place]us a odilia e onde dói  Monitore o que você fez para tratar sua odilia de cabeça e se funcionou  Evite olive odilia de cabeça aguda:  · Evite qualquer coisa que desencadeie olive odilia de [place]  Exemplos disso são exposição a produtos químicos, altas altitudes [employer]  Crie olive rotina de [place] regular  Durma e acorde no mesmo horário todos os shah  Não use dispositivos eletrônicos antes de dormir  Christine podem desencadear olive odilia de cabeça ou impedir você de dormir bem  · Não fume  A nicotina e outros produtos químicos nos cigarros e charutos podem desencadear olive odilia de cabeça aguda ou piorá-la  Se você fuma e precisa de ajuda para parar de fumar, peça informações a seu médico  Cigarros eletrônicos ou tabaco alfred fumaça também contêm nicotina  Fale com seu médico antes de usar esses produtos  · Limite o consumo de álcool conforme indicado  O álcool pode desencadear olive odilia de cabeça aguda ou piorá-la  Se você tiver dores de cabeça em salvas, não ashutosh álcool basilio um episódio  Para outros tipos de dores de cabeça, pergunte ao seu médico se é seguro você beber álcool  Pergunte a quantidade e a frequência com que você pode beber com segurança  · Faça exercícios conforme orientado  O exercício pode reduzir a tensão e ajudar com a odilia de cabeça  Procure fazer 30 minutos de atividade física na maior parte [place]  Seu médico pode ajudá-lo a criar um programa de exercícios  · Coma olive variedade de alimentos saudáveis  Entre os alimentos saudáveis estão frutas, legumes, laticínios com baixo teor de gordura, mary magras, peixes, grãos integrais e feijões cozidos  Seu médico ou nutricionista pode ajudar você a criar cardápios se você precisar evitar alimentos que desencadeiam dores de cabeça  Faça o acompanhamento com seu médico conforme orientado: Leve seu registro hannah dores de cabeça com [employer] for a [employer]  Anote as dúvidas que você tiver para se lembrar de perguntar sobre elas basilio as consultas  © Copyright [employer] 2022 Information is for End User's use only and may not be sold, redistributed or otherwise used for commercial purposes  All illustrations and images included in CareNotes® are the copyrighted property of A D A M , Inc  or Megan Perales  The above information is an  only  It is not intended as medical advice for individual conditions or treatments  Talk to your doctor, nurse or pharmacist before following any medical regimen to see if it is safe and effective for you

## 2022-05-14 ENCOUNTER — HOSPITAL ENCOUNTER (EMERGENCY)
Facility: HOSPITAL | Age: 27
Discharge: HOME | End: 2022-05-14
Attending: EMERGENCY MEDICINE
Payer: COMMERCIAL

## 2022-05-14 VITALS
RESPIRATION RATE: 16 BRPM | DIASTOLIC BLOOD PRESSURE: 61 MMHG | TEMPERATURE: 98.1 F | BODY MASS INDEX: 16.81 KG/M2 | OXYGEN SATURATION: 97 % | WEIGHT: 101 LBS | HEART RATE: 88 BPM | SYSTOLIC BLOOD PRESSURE: 108 MMHG

## 2022-05-14 DIAGNOSIS — K52.9 GASTROENTERITIS: Primary | ICD-10-CM

## 2022-05-14 LAB
ALBUMIN SERPL BCP-MCNC: 4.6 G/DL (ref 3–5.2)
ALP SERPL-CCNC: 57 U/L (ref 43–122)
ALT SERPL W P-5'-P-CCNC: 15 U/L
ANION GAP SERPL CALCULATED.3IONS-SCNC: 11 MMOL/L (ref 5–14)
AST SERPL W P-5'-P-CCNC: 20 U/L (ref 14–36)
BACTERIA UR QL AUTO: ABNORMAL /HPF
BASOPHILS # BLD AUTO: 0.02 THOUSANDS/ΜL (ref 0–0.1)
BASOPHILS NFR BLD AUTO: 1 % (ref 0–1)
BILIRUB SERPL-MCNC: 0.7 MG/DL
BILIRUB UR QL STRIP: NEGATIVE
BUN SERPL-MCNC: 10 MG/DL (ref 5–25)
CALCIUM SERPL-MCNC: 9 MG/DL (ref 8.4–10.2)
CHLORIDE SERPL-SCNC: 103 MMOL/L (ref 97–108)
CLARITY UR: CLEAR
CO2 SERPL-SCNC: 25 MMOL/L (ref 22–30)
COLOR UR: ABNORMAL
CREAT SERPL-MCNC: 0.52 MG/DL (ref 0.6–1.2)
EOSINOPHIL # BLD AUTO: 0.03 THOUSAND/ΜL (ref 0–0.61)
EOSINOPHIL NFR BLD AUTO: 1 % (ref 0–6)
ERYTHROCYTE [DISTWIDTH] IN BLOOD BY AUTOMATED COUNT: 12.6 % (ref 11.6–15.1)
EXT PREG TEST URINE: NEGATIVE
EXT. CONTROL ED NAV: NORMAL
GFR SERPL CREATININE-BSD FRML MDRD: 133 ML/MIN/1.73SQ M
GLUCOSE SERPL-MCNC: 95 MG/DL (ref 70–99)
GLUCOSE UR STRIP-MCNC: NEGATIVE MG/DL
HCT VFR BLD AUTO: 41 % (ref 34.8–46.1)
HGB BLD-MCNC: 13.4 G/DL (ref 11.5–15.4)
HGB UR QL STRIP.AUTO: 25
IMM GRANULOCYTES # BLD AUTO: 0.01 THOUSAND/UL (ref 0–0.2)
IMM GRANULOCYTES NFR BLD AUTO: 0 % (ref 0–2)
KETONES UR STRIP-MCNC: ABNORMAL MG/DL
LEUKOCYTE ESTERASE UR QL STRIP: 25
LIPASE SERPL-CCNC: 26 U/L (ref 23–300)
LYMPHOCYTES # BLD AUTO: 0.31 THOUSANDS/ΜL (ref 0.6–4.47)
LYMPHOCYTES NFR BLD AUTO: 8 % (ref 14–44)
MCH RBC QN AUTO: 27.9 PG (ref 26.8–34.3)
MCHC RBC AUTO-ENTMCNC: 32.7 G/DL (ref 31.4–37.4)
MCV RBC AUTO: 85 FL (ref 82–98)
MONOCYTES # BLD AUTO: 0.4 THOUSAND/ΜL (ref 0.17–1.22)
MONOCYTES NFR BLD AUTO: 10 % (ref 4–12)
MUCOUS THREADS UR QL AUTO: ABNORMAL
NEUTROPHILS # BLD AUTO: 3.2 THOUSANDS/ΜL (ref 1.85–7.62)
NEUTS SEG NFR BLD AUTO: 80 % (ref 43–75)
NITRITE UR QL STRIP: NEGATIVE
NON-SQ EPI CELLS URNS QL MICRO: ABNORMAL /HPF
NRBC BLD AUTO-RTO: 0 /100 WBCS
PH UR STRIP.AUTO: 6 [PH]
PLATELET # BLD AUTO: 208 THOUSANDS/UL (ref 149–390)
PMV BLD AUTO: 11.1 FL (ref 8.9–12.7)
POTASSIUM SERPL-SCNC: 4.1 MMOL/L (ref 3.6–5)
PROT SERPL-MCNC: 8.5 G/DL (ref 5.9–8.4)
PROT UR STRIP-MCNC: ABNORMAL MG/DL
RBC # BLD AUTO: 4.81 MILLION/UL (ref 3.81–5.12)
RBC #/AREA URNS AUTO: ABNORMAL /HPF
SODIUM SERPL-SCNC: 139 MMOL/L (ref 137–147)
SP GR UR STRIP.AUTO: 1.02 (ref 1–1.04)
URINE COMMENT: ABNORMAL
UROBILINOGEN UA: NEGATIVE MG/DL
WBC # BLD AUTO: 3.97 THOUSAND/UL (ref 4.31–10.16)
WBC #/AREA URNS AUTO: ABNORMAL /HPF

## 2022-05-14 PROCEDURE — 80053 COMPREHEN METABOLIC PANEL: CPT | Performed by: PHYSICIAN ASSISTANT

## 2022-05-14 PROCEDURE — 96374 THER/PROPH/DIAG INJ IV PUSH: CPT

## 2022-05-14 PROCEDURE — 99284 EMERGENCY DEPT VISIT MOD MDM: CPT | Performed by: PHYSICIAN ASSISTANT

## 2022-05-14 PROCEDURE — 99283 EMERGENCY DEPT VISIT LOW MDM: CPT

## 2022-05-14 PROCEDURE — 81001 URINALYSIS AUTO W/SCOPE: CPT | Performed by: PHYSICIAN ASSISTANT

## 2022-05-14 PROCEDURE — 81003 URINALYSIS AUTO W/O SCOPE: CPT | Performed by: PHYSICIAN ASSISTANT

## 2022-05-14 PROCEDURE — 36415 COLL VENOUS BLD VENIPUNCTURE: CPT | Performed by: PHYSICIAN ASSISTANT

## 2022-05-14 PROCEDURE — 96361 HYDRATE IV INFUSION ADD-ON: CPT

## 2022-05-14 PROCEDURE — 81025 URINE PREGNANCY TEST: CPT | Performed by: PHYSICIAN ASSISTANT

## 2022-05-14 PROCEDURE — 83690 ASSAY OF LIPASE: CPT | Performed by: PHYSICIAN ASSISTANT

## 2022-05-14 PROCEDURE — 85025 COMPLETE CBC W/AUTO DIFF WBC: CPT | Performed by: PHYSICIAN ASSISTANT

## 2022-05-14 PROCEDURE — 96375 TX/PRO/DX INJ NEW DRUG ADDON: CPT

## 2022-05-14 RX ORDER — ONDANSETRON 2 MG/ML
4 INJECTION INTRAMUSCULAR; INTRAVENOUS ONCE
Status: COMPLETED | OUTPATIENT
Start: 2022-05-14 | End: 2022-05-14

## 2022-05-14 RX ORDER — ONDANSETRON 4 MG/1
4 TABLET, FILM COATED ORAL EVERY 8 HOURS PRN
Qty: 20 TABLET | Refills: 0 | Status: SHIPPED | OUTPATIENT
Start: 2022-05-14

## 2022-05-14 RX ORDER — SUCRALFATE 1 G/1
1 TABLET ORAL 4 TIMES DAILY
Qty: 20 TABLET | Refills: 0 | Status: SHIPPED | OUTPATIENT
Start: 2022-05-14

## 2022-05-14 RX ORDER — ACETAMINOPHEN 325 MG/1
650 TABLET ORAL ONCE
Status: COMPLETED | OUTPATIENT
Start: 2022-05-14 | End: 2022-05-14

## 2022-05-14 RX ORDER — FAMOTIDINE 10 MG/ML
10 INJECTION, SOLUTION INTRAVENOUS ONCE
Status: COMPLETED | OUTPATIENT
Start: 2022-05-14 | End: 2022-05-14

## 2022-05-14 RX ORDER — OMEPRAZOLE 20 MG/1
20 CAPSULE, DELAYED RELEASE ORAL DAILY
Qty: 20 CAPSULE | Refills: 0 | Status: SHIPPED | OUTPATIENT
Start: 2022-05-14

## 2022-05-14 RX ORDER — LOPERAMIDE HYDROCHLORIDE 2 MG/1
2 CAPSULE ORAL 3 TIMES DAILY
Qty: 12 CAPSULE | Refills: 0 | Status: SHIPPED | OUTPATIENT
Start: 2022-05-14

## 2022-05-14 RX ORDER — SODIUM CHLORIDE 9 MG/ML
250 INJECTION, SOLUTION INTRAVENOUS CONTINUOUS
Status: DISCONTINUED | OUTPATIENT
Start: 2022-05-14 | End: 2022-05-14 | Stop reason: HOSPADM

## 2022-05-14 RX ADMIN — FAMOTIDINE 10 MG: 10 INJECTION INTRAVENOUS at 11:41

## 2022-05-14 RX ADMIN — ACETAMINOPHEN 650 MG: 325 TABLET ORAL at 11:39

## 2022-05-14 RX ADMIN — SODIUM CHLORIDE 250 ML/HR: 0.9 INJECTION, SOLUTION INTRAVENOUS at 11:38

## 2022-05-14 RX ADMIN — ONDANSETRON 4 MG: 2 INJECTION INTRAMUSCULAR; INTRAVENOUS at 11:40

## 2022-05-14 NOTE — ED PROVIDER NOTES
History  Chief Complaint   Patient presents with    Vomiting     Vomiting and diarrhea since yesterday  Imodium and ibuprofen taken yesterday with no relief     Pt with 2 days of nausea and vomiting and diarrhea and abdomen pain       Abdominal Pain  Pain location:  Epigastric and suprapubic  Pain quality: aching    Pain radiates to:  Does not radiate  Pain severity:  Mild  Onset quality:  Gradual  Duration:  2 days  Timing:  Constant  Progression:  Unchanged  Chronicity:  New  Context: not alcohol use    Relieved by:  Nothing  Worsened by:  Nothing  Ineffective treatments:  None tried  Associated symptoms: diarrhea, nausea and vomiting    Risk factors: no alcohol abuse and has not had multiple surgeries        Prior to Admission Medications   Prescriptions Last Dose Informant Patient Reported? Taking?   naproxen (Naprosyn) 500 mg tablet Not Taking at Unknown time  No No   Sig: Take 1 tablet (500 mg total) by mouth 2 (two) times a day as needed for headaches   Patient not taking: Reported on 5/14/2022   ondansetron (ZOFRAN) 4 mg tablet   No No   Sig: Take 1 tablet (4 mg total) by mouth every 8 (eight) hours as needed for nausea or vomiting for up to 4 days      Facility-Administered Medications: None       Past Medical History:   Diagnosis Date    [facility]        History reviewed  No pertinent surgical history  History reviewed  No pertinent family history  I have reviewed and agree with the history as documented  E-Cigarette/Vaping     E-Cigarette/Vaping Substances     Social History     Tobacco Use    Smoking status: Never Smoker    Smokeless tobacco: Never Used   Substance Use Topics    Alcohol use: Not Currently    Drug use: Never       Review of Systems   Constitutional: Negative  HENT: Negative  Eyes: Negative  Respiratory: Negative  Cardiovascular: Negative  Gastrointestinal: Positive for abdominal pain, diarrhea, nausea and vomiting  Endocrine: Negative      Genitourinary: Negative  Musculoskeletal: Negative  Skin: Negative  Allergic/Immunologic: Negative  Neurological: Negative  Hematological: Negative  Psychiatric/Behavioral: Negative  All other systems reviewed and are negative  Physical Exam  Physical Exam  Vitals and nursing note reviewed  Constitutional:       Appearance: Normal appearance  She is normal weight  Comments: 1205pm  Pt feeling much better no nausea no pain   HENT:      Head: Normocephalic and atraumatic  Right Ear: Tympanic membrane, ear canal and external ear normal       Left Ear: Tympanic membrane, ear canal and external ear normal       Nose: Nose normal       Mouth/Throat:      Mouth: Mucous membranes are moist       Pharynx: Oropharynx is clear  Eyes:      Extraocular Movements: Extraocular movements intact  Conjunctiva/sclera: Conjunctivae normal       Pupils: Pupils are equal, round, and reactive to light  Cardiovascular:      Rate and Rhythm: Normal rate and regular rhythm  Pulses: Normal pulses  Heart sounds: Normal heart sounds  Pulmonary:      Effort: Pulmonary effort is normal       Breath sounds: Normal breath sounds  Abdominal:      General: Abdomen is flat  Bowel sounds are normal       Palpations: Abdomen is soft  Comments: midepigastic tender     Musculoskeletal:         General: Normal range of motion  Cervical back: Normal range of motion and neck supple  Skin:     General: Skin is warm  Capillary Refill: Capillary refill takes less than 2 seconds  Neurological:      General: No focal deficit present  Mental Status: She is alert and oriented to person, place, and time     Psychiatric:         Mood and Affect: Mood normal          Vital Signs  ED Triage Vitals [05/14/22 1105]   Temperature Pulse Respirations Blood Pressure SpO2   98 1 °F (36 7 °C) 101 18 112/67 98 %      Temp Source Heart Rate Source Patient Position - Orthostatic VS BP Location FiO2 (%) Tympanic Monitor Sitting Left arm --      Pain Score       --           Vitals:    05/14/22 1105 05/14/22 1229   BP: 112/67 108/61   Pulse: 101 88   Patient Position - Orthostatic VS: Sitting Sitting         Visual Acuity      ED Medications  Medications   ondansetron (ZOFRAN) injection 4 mg (4 mg Intravenous Given 5/14/22 1140)   Famotidine (PF) (PEPCID) injection 10 mg (10 mg Intravenous Given 5/14/22 1141)   acetaminophen (TYLENOL) tablet 650 mg (650 mg Oral Given 5/14/22 1139)       Diagnostic Studies  Results Reviewed     Procedure Component Value Units Date/Time    Lipase [[id number]]  (Normal) Collected: 05/14/22 1139    Lab Status: Final result Specimen: Blood from Arm, Right Updated: 05/14/22 1202     Lipase 26 u/L     Comprehensive metabolic panel [[id number]]  (Abnormal) Collected: 05/14/22 1139    Lab Status: Final result Specimen: Blood from Arm, Right Updated: 05/14/22 1202     Sodium 139 mmol/L      Potassium 4 1 mmol/L      Chloride 103 mmol/L      CO2 25 mmol/L      ANION GAP 11 mmol/L      BUN 10 mg/dL      Creatinine 0 52 mg/dL      Glucose 95 mg/dL      Calcium 9 0 mg/dL      AST 20 U/L      ALT 15 U/L      Alkaline Phosphatase 57 U/L      Total Protein 8 5 g/dL      Albumin 4 6 g/dL      Total Bilirubin 0 70 mg/dL      eGFR 133 ml/min/1 73sq m     Narrative:      [place] guidelines for Chronic Kidney Disease (CKD):     Stage 1 with normal or high GFR (GFR > 90 mL/min/1 73 square meters)    Stage 2 Mild CKD (GFR = 60-89 mL/min/1 73 square meters)    Stage 3A Moderate CKD (GFR = 45-59 mL/min/1 73 square meters)    Stage 3B Moderate CKD (GFR = 30-44 mL/min/1 73 square meters)    Stage 4 Severe CKD (GFR = 15-29 mL/min/1 73 square meters)    Stage 5 End Stage CKD (GFR <15 mL/min/1 73 square meters)  Note: GFR calculation is accurate only with a steady state creatinine    CBC and differential [[id number]]  (Abnormal) Collected: 05/14/22 1139    Lab Status: Final result Specimen: Blood from Arm, Right Updated: 05/14/22 1152     WBC 3 97 Thousand/uL      RBC 4 81 Million/uL      Hemoglobin 13 4 g/dL      Hematocrit 41 0 %      MCV 85 fL      MCH 27 9 pg      MCHC 32 7 g/dL      RDW 12 6 %      MPV 11 1 fL      Platelets 614 Thousands/uL      nRBC 0 /100 WBCs      Neutrophils Relative 80 %      Immat GRANS % 0 %      Lymphocytes Relative 8 %      Monocytes Relative 10 %      Eosinophils Relative 1 %      Basophils Relative 1 %      Neutrophils Absolute 3 20 Thousands/µL      Immature Grans Absolute 0 01 Thousand/uL      Lymphocytes Absolute 0 31 Thousands/µL      Monocytes Absolute 0 40 Thousand/µL      Eosinophils Absolute 0 03 Thousand/µL      Basophils Absolute 0 02 Thousands/µL     Urine Microscopic [[id number]]  (Abnormal) Collected: 05/14/22 1118    Lab Status: Final result Specimen: Urine, Clean Catch Updated: 05/14/22 1143     RBC, UA 0-1 /hpf      WBC, UA 1-2 /hpf      Epithelial Cells Occasional /hpf      Bacteria, UA None Seen /hpf      MUCUS THREADS Moderate     URINE COMMENT Urine microscopic done on specimen not concentrated due to low sample volume submitted    UA w Reflex to Microscopic w Reflex to Culture [[id number]]  (Abnormal) Collected: 05/14/22 1118    Lab Status: Final result Specimen: Urine, Clean Catch Updated: 05/14/22 1132     Color, UA Imelda     Clarity, UA Clear     Specific Gravity, UA 1 025     pH, UA 6 0     Leukocytes, UA 25 0     Nitrite, UA Negative     Protein, UA 30 (1+) mg/dl      Glucose, UA Negative mg/dl      Ketones, UA 5 (Trace) mg/dl      Bilirubin, UA Negative     Blood, UA 25 0     UROBILINOGEN UA Negative mg/dL     POCT pregnancy, urine [[id number]]  (Normal) Resulted: 05/14/22 1119    Lab Status: Final result Updated: 05/14/22 1119     EXT PREG TEST UR (Ref: Negative) negative     Control valid                 No orders to display              Procedures  Procedures         ED Course                               SBIRT 20yo+    Flowsheet Row Most Recent Value   SBIRT (23 yo +)    In order to provide better care to our patients, we are screening all of our patients for alcohol and drug use  Would it be okay to ask you these screening questions? Yes Filed at: 05/14/2022 1149   Initial Alcohol Screen: US AUDIT-C     1  How often do you have a drink containing alcohol? 0 Filed at: 05/14/2022 1149   2  How many drinks containing alcohol do you have on a typical day you are drinking? 0 Filed at: 05/14/2022 1149   3b  FEMALE Any Age, or MALE 65+: How often do you have 4 or more drinks on one occassion? 0 Filed at: 05/14/2022 1149   Audit-C Score 0 Filed at: 05/14/2022 1149   TATA: How many times in the past year have you    Used an illegal drug or used a prescription medication for non-medical reasons? Never Filed at: 05/14/2022 1149                    MDM    Disposition  Final diagnoses:   Gastroenteritis     Time reflects when diagnosis was documented in both MDM as applicable and the Disposition within this note     Time User Action Codes Description Comment    5/14/2022 12:08 PM Horacio Soto  Add [K52 9] Gastroenteritis       ED Disposition     ED Disposition   Discharge    Condition   Stable    Date/Time   Sat May 14, 2022 12:08 PM    Comment   Laya Giraldo discharge to home/self care  Follow-up Information     Follow up With Specialties Details Why [address]  [phone]            Discharge Medication List as of 5/14/2022 12:12 PM      START taking these medications    Details   loperamide (IMODIUM) 2 mg capsule Take 1 capsule (2 mg total) by mouth in the morning and 1 capsule (2 mg total) in the evening and 1 capsule (2 mg total) before bedtime   1 tab po tid , Starting Sat 5/14/2022, Print      omeprazole (PriLOSEC) 20 mg delayed release capsule Take 1 capsule (20 mg total) by mouth in the morning , Starting Sat 5/14/2022, Print      sucralfate (CARAFATE) 1 g tablet Take 1 tablet (1 g total) by mouth in the morning and 1 tablet (1 g total) at noon and 1 tablet (1 g total) in the evening and 1 tablet (1 g total) before bedtime  , Starting Sat 5/14/2022, Print         CONTINUE these medications which have CHANGED    Details   ondansetron (ZOFRAN) 4 mg tablet Take 1 tablet (4 mg total) by mouth every 8 (eight) hours as needed for nausea, Starting Sat 5/14/2022, Print         CONTINUE these medications which have NOT CHANGED    Details   naproxen (Naprosyn) 500 mg tablet Take 1 tablet (500 mg total) by mouth 2 (two) times a day as needed for headaches, Starting Mon 2/28/2022, Normal             No discharge procedures on file      PDMP Review     None          ED Provider  Electronically Signed by           Shawnee Dakin , PA-C  05/14/22 8974

## 2022-07-26 ENCOUNTER — HOSPITAL ENCOUNTER (EMERGENCY)
Facility: HOSPITAL | Age: 27
Discharge: HOME | End: 2022-07-26
Attending: EMERGENCY MEDICINE | Admitting: EMERGENCY MEDICINE
Payer: COMMERCIAL

## 2022-07-26 VITALS
SYSTOLIC BLOOD PRESSURE: 102 MMHG | WEIGHT: 105.38 LBS | TEMPERATURE: 98.4 F | DIASTOLIC BLOOD PRESSURE: 55 MMHG | OXYGEN SATURATION: 100 % | BODY MASS INDEX: 17.54 KG/M2 | HEART RATE: 81 BPM | RESPIRATION RATE: 18 BRPM

## 2022-07-26 DIAGNOSIS — J02.9 ACUTE PHARYNGITIS, UNSPECIFIED ETIOLOGY: Primary | ICD-10-CM

## 2022-07-26 PROCEDURE — 99283 EMERGENCY DEPT VISIT LOW MDM: CPT

## 2022-07-26 PROCEDURE — 99284 EMERGENCY DEPT VISIT MOD MDM: CPT | Performed by: EMERGENCY MEDICINE

## 2022-07-26 RX ORDER — AMOXICILLIN 500 MG/1
500 CAPSULE ORAL EVERY 12 HOURS SCHEDULED
Qty: 14 CAPSULE | Refills: 0 | Status: SHIPPED | OUTPATIENT
Start: 2022-07-26 | End: 2022-08-02

## 2022-07-26 RX ORDER — IBUPROFEN 600 MG/1
600 TABLET ORAL EVERY 6 HOURS PRN
Qty: 30 TABLET | Refills: 0 | Status: SHIPPED | OUTPATIENT
Start: 2022-07-26

## 2022-07-26 RX ORDER — IBUPROFEN 600 MG/1
600 TABLET ORAL ONCE
Status: COMPLETED | OUTPATIENT
Start: 2022-07-26 | End: 2022-07-26

## 2022-07-26 RX ORDER — AMOXICILLIN 500 MG/1
500 CAPSULE ORAL ONCE
Status: COMPLETED | OUTPATIENT
Start: 2022-07-26 | End: 2022-07-26

## 2022-07-26 RX ORDER — DEXAMETHASONE 4 MG/1
8 TABLET ORAL ONCE
Status: COMPLETED | OUTPATIENT
Start: 2022-07-26 | End: 2022-07-26

## 2022-07-26 RX ADMIN — DEXAMETHASONE 8 MG: 4 TABLET ORAL at 11:40

## 2022-07-26 RX ADMIN — IBUPROFEN 600 MG: 600 TABLET ORAL at 11:40

## 2022-07-26 RX ADMIN — AMOXICILLIN 500 MG: 500 CAPSULE ORAL at 11:40

## 2022-07-26 NOTE — ED PROVIDER NOTES
History  Chief Complaint   Patient presents with    Sore Throat     States last night had fever and sore throat; sore throat continues today  23-year-old female present emergency with sore throat for 3 days  Worsening or 3 days  Pain with swallowing  No difficulty breathing  No chest pain or shortness of breath  Having headache and fevers and chills  No cough  History provided by:  Patient  Sore Throat  Location:  Generalized  Quality:  Aching  Severity:  Moderate  Onset quality:  Gradual  Duration:  3 days  Timing:  Constant  Progression:  Worsening  Chronicity:  New  Relieved by:  Nothing  Worsened by:  Nothing  Ineffective treatments:  None tried  Associated symptoms: chills, fever and headaches    Associated symptoms: no abdominal pain, no chest pain, no cough, no ear pain, no rash and no shortness of breath        Prior to Admission Medications   Prescriptions Last Dose Informant Patient Reported? Taking?   loperamide (IMODIUM) 2 mg capsule   No No   Sig: Take 1 capsule (2 mg total) by mouth in the morning and 1 capsule (2 mg total) in the evening and 1 capsule (2 mg total) before bedtime  1 tab po tid  naproxen (Naprosyn) 500 mg tablet   No No   Sig: Take 1 tablet (500 mg total) by mouth 2 (two) times a day as needed for headaches   Patient not taking: Reported on 5/14/2022   omeprazole (PriLOSEC) 20 mg delayed release capsule   No No   Sig: Take 1 capsule (20 mg total) by mouth in the morning  ondansetron (ZOFRAN) 4 mg tablet   No No   Sig: Take 1 tablet (4 mg total) by mouth every 8 (eight) hours as needed for nausea or vomiting for up to 4 days   ondansetron (ZOFRAN) 4 mg tablet   No No   Sig: Take 1 tablet (4 mg total) by mouth every 8 (eight) hours as needed for nausea   sucralfate (CARAFATE) 1 g tablet   No No   Sig: Take 1 tablet (1 g total) by mouth in the morning and 1 tablet (1 g total) at noon and 1 tablet (1 g total) in the evening and 1 tablet (1 g total) before bedtime  Facility-Administered Medications: None       Past Medical History:   Diagnosis Date    Seizures ([facility] )        No past surgical history on file  No family history on file  I have reviewed and agree with the history as documented  E-Cigarette/Vaping    E-Cigarette Use Never User      E-Cigarette/Vaping Substances     Social History     Tobacco Use    Smoking status: Never Smoker    Smokeless tobacco: Never Used   Vaping Use    Vaping Use: Never used   Substance Use Topics    Alcohol use: Not Currently    Drug use: Never       Review of Systems   Constitutional: Positive for chills and fever  HENT: Positive for sore throat  Negative for ear pain  Eyes: Negative for pain and visual disturbance  Respiratory: Negative for cough and shortness of breath  Cardiovascular: Negative for chest pain and palpitations  Gastrointestinal: Negative for abdominal pain and vomiting  Genitourinary: Negative for dysuria and hematuria  Musculoskeletal: Negative for arthralgias and back pain  Skin: Negative for color change and rash  Neurological: Positive for headaches  Negative for seizures and syncope  All other systems reviewed and are negative  Physical Exam  Physical Exam  Vitals and nursing note reviewed  Constitutional:       General: She is not in acute distress  Appearance: She is well-developed  HENT:      Head: Normocephalic and atraumatic  Right Ear: Tympanic membrane normal       Left Ear: Tympanic membrane normal       Nose: No congestion or rhinorrhea  Mouth/Throat:      Mouth: Mucous membranes are moist       Pharynx: Uvula midline  Pharyngeal swelling, oropharyngeal exudate and posterior oropharyngeal erythema present  No uvula swelling  Tonsils: Tonsillar exudate present  No tonsillar abscesses  4+ on the right  4+ on the left  Eyes:      Conjunctiva/sclera: Conjunctivae normal    Cardiovascular:      Rate and Rhythm: Normal rate and regular rhythm  Heart sounds: No murmur heard  Pulmonary:      Effort: Pulmonary effort is normal  No respiratory distress  Breath sounds: Normal breath sounds  Abdominal:      Palpations: Abdomen is soft  Tenderness: There is no abdominal tenderness  Musculoskeletal:      Cervical back: Neck supple  Skin:     General: Skin is warm and dry  Capillary Refill: Capillary refill takes less than 2 seconds  Neurological:      Mental Status: She is alert and oriented to person, place, and time  Vital Signs  ED Triage Vitals [07/26/22 1127]   Temperature Pulse Respirations Blood Pressure SpO2   98 4 °F (36 9 °C) 81 18 102/55 100 %      Temp Source Heart Rate Source Patient Position - Orthostatic VS BP Location FiO2 (%)   Oral Monitor Lying Left arm --      Pain Score       10 - Worst Possible Pain           Vitals:    07/26/22 1127   BP: 102/55   Pulse: 81   Patient Position - Orthostatic VS: Lying         Visual Acuity      ED Medications  Medications   dexamethasone (DECADRON) tablet 8 mg (8 mg Oral Given 7/26/22 1140)   amoxicillin (AMOXIL) capsule 500 mg (500 mg Oral Given 7/26/22 1140)   ibuprofen (MOTRIN) tablet 600 mg (600 mg Oral Given 7/26/22 1140)       Diagnostic Studies  Results Reviewed     None                 No orders to display              Procedures  Procedures         ED Course                               SBIRT 20yo+    Flowsheet Row Most Recent Value   SBIRT (23 yo +)    In order to provide better care to our patients, we are screening all of our patients for alcohol and drug use  Would it be okay to ask you these screening questions? No Filed at: 07/26/2022 1139                    MDM  Number of Diagnoses or Management Options  Acute pharyngitis, unspecified etiology  Diagnosis management comments: Very extensive bilateral tonsillar swelling with purulent drainage  Will treat with amoxicillin, likely bacterial pharyngitis    Will also give Decadron for the amount of swelling  Disposition  Final diagnoses:   Acute pharyngitis, unspecified etiology     Time reflects when diagnosis was documented in both MDM as applicable and the Disposition within this note     Time User Action Codes Description Comment    7/26/2022 11:38 AM Jaswant Calix Add [J02 9] Acute pharyngitis, unspecified etiology       ED Disposition     ED Disposition   Discharge    Condition   Stable    Date/Time   Tue Jul 26, 2022 11:37 AM    Comment   Saji Peña discharge to home/self care  Follow-up Information     Follow up With Specialties Details Why [address] [facility] [address] [facility] Family Medicine In 7 days  [address] [place] [address]  [phone]            Discharge Medication List as of 7/26/2022 11:40 AM      START taking these medications    Details   amoxicillin (AMOXIL) 500 mg capsule Take 1 capsule (500 mg total) by mouth every 12 (twelve) hours for 7 days, Starting Tue 7/26/2022, Until Tue 8/2/2022, Print      ibuprofen (MOTRIN) 600 mg tablet Take 1 tablet (600 mg total) by mouth every 6 (six) hours as needed for mild pain, Starting Tue 7/26/2022, Print         CONTINUE these medications which have NOT CHANGED    Details   loperamide (IMODIUM) 2 mg capsule Take 1 capsule (2 mg total) by mouth in the morning and 1 capsule (2 mg total) in the evening and 1 capsule (2 mg total) before bedtime   1 tab po tid , Starting Sat 5/14/2022, Print      naproxen (Naprosyn) 500 mg tablet Take 1 tablet (500 mg total) by mouth 2 (two) times a day as needed for headaches, Starting Mon 2/28/2022, Normal      omeprazole (PriLOSEC) 20 mg delayed release capsule Take 1 capsule (20 mg total) by mouth in the morning , Starting Sat 5/14/2022, Print      ondansetron (ZOFRAN) 4 mg tablet Take 1 tablet (4 mg total) by mouth every 8 (eight) hours as needed for nausea, Starting Sat 5/14/2022, Print      sucralfate (CARAFATE) 1 g tablet Take 1 tablet (1 g total) by mouth in the morning and 1 tablet (1 g total) at noon and 1 tablet (1 g total) in the evening and 1 tablet (1 g total) before bedtime  , Starting Sat 5/14/2022, Print             No discharge procedures on file      PDMP Review     None          ED Provider  Electronically Signed by           Ethel Rosado MD  07/26/22 2631

## 2022-07-26 NOTE — Clinical Note
Lagavinsahra Robersonaxel was seen and treated in our emergency department on 7/26/2022  Diagnosis:     Curtis Melendez  may return to work on return date  She may return on this date: 07/29/2022         If you have any questions or concerns, please don't hesitate to call        Henrietta Kaye MD    ______________________________           _______________          _______________  Hospital Representative                              Date                                Time

## 2022-10-21 ENCOUNTER — HOSPITAL ENCOUNTER (EMERGENCY)
Facility: HOSPITAL | Age: 27
Discharge: HOME | End: 2022-10-21
Attending: EMERGENCY MEDICINE
Payer: COMMERCIAL

## 2022-10-21 VITALS
DIASTOLIC BLOOD PRESSURE: 66 MMHG | HEART RATE: 85 BPM | SYSTOLIC BLOOD PRESSURE: 107 MMHG | BODY MASS INDEX: 17.28 KG/M2 | TEMPERATURE: 98.1 F | OXYGEN SATURATION: 100 % | RESPIRATION RATE: 16 BRPM | WEIGHT: 103.84 LBS

## 2022-10-21 DIAGNOSIS — S46.812A STRAIN OF LEFT TRAPEZIUS MUSCLE, INITIAL ENCOUNTER: Primary | ICD-10-CM

## 2022-10-21 PROCEDURE — 96372 THER/PROPH/DIAG INJ SC/IM: CPT

## 2022-10-21 PROCEDURE — 99283 EMERGENCY DEPT VISIT LOW MDM: CPT

## 2022-10-21 PROCEDURE — 99284 EMERGENCY DEPT VISIT MOD MDM: CPT | Performed by: EMERGENCY MEDICINE

## 2022-10-21 RX ORDER — KETOROLAC TROMETHAMINE 30 MG/ML
30 INJECTION, SOLUTION INTRAMUSCULAR; INTRAVENOUS ONCE
Status: COMPLETED | OUTPATIENT
Start: 2022-10-21 | End: 2022-10-21

## 2022-10-21 RX ORDER — LIDOCAINE 50 MG/G
1 PATCH TOPICAL DAILY
Qty: 7 PATCH | Refills: 0 | Status: SHIPPED | OUTPATIENT
Start: 2022-10-21

## 2022-10-21 RX ORDER — LIDOCAINE 50 MG/G
1 PATCH TOPICAL ONCE
Status: DISCONTINUED | OUTPATIENT
Start: 2022-10-21 | End: 2022-10-21 | Stop reason: HOSPADM

## 2022-10-21 RX ORDER — IBUPROFEN 400 MG/1
400 TABLET ORAL EVERY 6 HOURS PRN
Qty: 15 TABLET | Refills: 0 | Status: SHIPPED | OUTPATIENT
Start: 2022-10-21

## 2022-10-21 RX ADMIN — KETOROLAC TROMETHAMINE 30 MG: 30 INJECTION, SOLUTION INTRAMUSCULAR at 12:00

## 2022-10-21 RX ADMIN — LIDOCAINE 1 PATCH: 50 PATCH TOPICAL at 11:59

## 2022-10-21 NOTE — Clinical Note
Arely Rodriges was seen and treated in our emergency department on 10/21/2022  Diagnosis: left trapezius strain    Cecily Bartlett  may return to school on return date  She may return on this date: 10/24/2022         If you have any questions or concerns, please don't hesitate to call        Cosmo Aponte MD    ______________________________           _______________          _______________  Hospital Representative                              Date                                Time

## 2022-10-21 NOTE — ED PROVIDER NOTES
History  Chief Complaint   Patient presents with   • Back Pain     Pt c/o of upper back pain for 2 weeks  Yesterday and today getting worse denies any injury     HPI    Prior to Admission Medications   Prescriptions Last Dose Informant Patient Reported? Taking?   ibuprofen (MOTRIN) 600 mg tablet   No No   Sig: Take 1 tablet (600 mg total) by mouth every 6 (six) hours as needed for mild pain   loperamide (IMODIUM) 2 mg capsule   No No   Sig: Take 1 capsule (2 mg total) by mouth in the morning and 1 capsule (2 mg total) in the evening and 1 capsule (2 mg total) before bedtime  1 tab po tid  naproxen (Naprosyn) 500 mg tablet   No No   Sig: Take 1 tablet (500 mg total) by mouth 2 (two) times a day as needed for headaches   Patient not taking: Reported on 5/14/2022   omeprazole (PriLOSEC) 20 mg delayed release capsule   No No   Sig: Take 1 capsule (20 mg total) by mouth in the morning  ondansetron (ZOFRAN) 4 mg tablet   No No   Sig: Take 1 tablet (4 mg total) by mouth every 8 (eight) hours as needed for nausea or vomiting for up to 4 days   ondansetron (ZOFRAN) 4 mg tablet   No No   Sig: Take 1 tablet (4 mg total) by mouth every 8 (eight) hours as needed for nausea   sucralfate (CARAFATE) 1 g tablet   No No   Sig: Take 1 tablet (1 g total) by mouth in the morning and 1 tablet (1 g total) at noon and 1 tablet (1 g total) in the evening and 1 tablet (1 g total) before bedtime  Facility-Administered Medications: None       Past Medical History:   Diagnosis Date   • Seizures ([facility] [address] )        History reviewed  No pertinent surgical history  History reviewed  No pertinent family history  I have reviewed and agree with the history as documented      E-Cigarette/Vaping   • E-Cigarette Use Never User      E-Cigarette/Vaping Substances     Social History     Tobacco Use   • Smoking status: Current Every Day Smoker     Packs/day: 0 25   • Smokeless tobacco: Never Used   Vaping Use   • Vaping Use: Never used   Substance Use Topics   • Alcohol use: Not Currently   • Drug use: Never       Review of Systems    Physical Exam  Physical Exam    Vital Signs  ED Triage Vitals [10/21/22 1021]   Temperature Pulse Respirations Blood Pressure SpO2   98 1 °F (36 7 °C) 85 16 107/66 100 %      Temp Source Heart Rate Source Patient Position - Orthostatic VS BP Location FiO2 (%)   Oral Monitor Sitting Right arm --      Pain Score       --           Vitals:    10/21/22 1021   BP: 107/66   Pulse: 85   Patient Position - Orthostatic VS: Sitting         Visual Acuity      ED Medications  Medications   ketorolac (TORADOL) injection 30 mg (has no administration in time range)   lidocaine (LIDODERM) 5 % patch 1 patch (has no administration in time range)       Diagnostic Studies  Results Reviewed     None                 No orders to display              Procedures  Procedures         ED Course                               SBIRT 20yo+    Flowsheet Row Most Recent Value   SBIRT (25 yo +)    In order to provide better care to our patients, we are screening all of our patients for alcohol and drug use  Would it be okay to ask you these screening questions? No Filed at: 10/21/2022 1114                    MDM    Disposition  Final diagnoses:   Strain of left trapezius muscle, initial encounter     Time reflects when diagnosis was documented in both MDM as applicable and the Disposition within this note     Time User Action Codes Description Comment    10/21/2022 11:40 AM Devendra Goss Add [V89 551T] Strain of left trapezius muscle, initial encounter       ED Disposition     ED Disposition   Discharge    Condition   Stable    Date/Time   Fri Oct 21, 2022 11:40 AM    Comment   Kimmy Sánchez discharge to home/self care                 Follow-up Information     Follow up With Specialties Details Why Contact Info Additional [address] [facility] Schedule an appointment as soon as possible for a visit in 1 week for reevaluation [address], [address] [facility] [address], [address], [address] [place], [place], [address]          Patient's Medications   Discharge Prescriptions    IBUPROFEN (MOTRIN) 400 MG TABLET    Take 1 tablet (400 mg total) by mouth every 6 (six) hours as needed for mild pain or moderate pain for up to 15 doses       Start Date: 10/21/2022End Date: --       Order Dose: 400 mg       Quantity: 15 tablet    Refills: 0    LIDOCAINE (LIDODERM) 5 %    Apply 1 patch topically daily Remove & Discard patch within 12 hours or as directed by MD       Start Date: 10/21/2022End Date: --       Order Dose: 1 patch       Quantity: 7 patch    Refills: 0           PDMP Review     None          ED Provider  Electronically Signed by alcohol and drug use  Would it be okay to ask you these screening questions? No Filed at: 10/21/2022 1114                    MDM  Number of Diagnoses or Management Options  Strain of left trapezius muscle, initial encounter  Diagnosis management comments: No spinal tenderness  No fever  Somewhat limited range of motion at the shoulder however this appears to be due to the pain in the trapezius  Possible strain to the area  Does not radiate down the arm so do not feel that this is of radiculopathy presently  NSAIDs Lidoderm patch and follow-up with primary care  Examination of the shoulder joint and the neck and T-spine did not appear to elicit any tenderness at those locations  Patient Progress  Patient progress: stable      Disposition  Final diagnoses:   Strain of left trapezius muscle, initial encounter     Time reflects when diagnosis was documented in both MDM as applicable and the Disposition within this note     Time User Action Codes Description Comment    10/21/2022 11:40 AM Neli Ruiz [M50 [id number]] Strain of left trapezius muscle, initial encounter       ED Disposition     ED Disposition   Discharge    Condition   Stable    Date/Time   Fri Oct 21, 2022 11:40 AM    Abby Hoff discharge to home/self care                 Follow-up Information     Follow up With Specialties Details Why Contact Info Additional [address] [facility] Schedule an appointment as soon as possible for a visit in 4 week for reevaluation [address], [address] [facility] [address]  [address] [place] [address], [address], [address] [place], [place], [address]          Discharge Medication List as of 10/21/2022 11:44 AM      START taking these medications    Details   !! ibuprofen (MOTRIN) 400 mg tablet Take 1 tablet (400 mg total) by mouth every 6 (six) hours as needed for mild pain or moderate pain for up to 15 doses, Starting Fri 10/21/2022, Normal      lidocaine (LIDODERM) 5 % Apply 1 patch topically daily Remove & Discard patch within 12 hours or as directed by MD, Starting Fri 10/21/2022, Normal       !! - Potential duplicate medications found  Please discuss with provider  CONTINUE these medications which have NOT CHANGED    Details   !! ibuprofen (MOTRIN) 600 mg tablet Take 1 tablet (600 mg total) by mouth every 6 (six) hours as needed for mild pain, Starting Tue 7/26/2022, Print      loperamide (IMODIUM) 2 mg capsule Take 1 capsule (2 mg total) by mouth in the morning and 1 capsule (2 mg total) in the evening and 1 capsule (2 mg total) before bedtime  1 tab po tid , Starting Sat 5/14/2022, Print      naproxen (Naprosyn) 500 mg tablet Take 1 tablet (500 mg total) by mouth 2 (two) times a day as needed for headaches, Starting Mon 2/28/2022, Normal      omeprazole (PriLOSEC) 20 mg delayed release capsule Take 1 capsule (20 mg total) by mouth in the morning , Starting Sat 5/14/2022, Print      ondansetron (ZOFRAN) 4 mg tablet Take 1 tablet (4 mg total) by mouth every 8 (eight) hours as needed for nausea, Starting Sat 5/14/2022, Print      sucralfate (CARAFATE) 1 g tablet Take 1 tablet (1 g total) by mouth in the morning and 1 tablet (1 g total) at noon and 1 tablet (1 g total) in the evening and 1 tablet (1 g total) before bedtime  , Starting Sat 5/14/2022, Print       !! - Potential duplicate medications found  Please discuss with provider                PDMP Review     None          ED Provider  Electronically Signed by           Vinny Mitchell MD  10/25/22 7292

## 2022-12-08 ENCOUNTER — OFFICE VISIT (OUTPATIENT)
Dept: OBGYN CLINIC | Facility: CLINIC | Age: 27
End: 2022-12-08

## 2022-12-08 VITALS
HEART RATE: 80 BPM | BODY MASS INDEX: 18.99 KG/M2 | WEIGHT: 103.2 LBS | SYSTOLIC BLOOD PRESSURE: 110 MMHG | DIASTOLIC BLOOD PRESSURE: 71 MMHG | HEIGHT: 62 IN

## 2022-12-08 DIAGNOSIS — Z12.39 ENCOUNTER FOR BREAST CANCER SCREENING USING NON-MAMMOGRAM MODALITY: ICD-10-CM

## 2022-12-08 DIAGNOSIS — Z12.4 SCREENING FOR CERVICAL CANCER: ICD-10-CM

## 2022-12-08 DIAGNOSIS — Z01.419 ENCOUNTER FOR GYNECOLOGICAL EXAMINATION WITHOUT ABNORMAL FINDING: Primary | ICD-10-CM

## 2022-12-08 PROBLEM — F17.210 CIGARETTE NICOTINE DEPENDENCE WITHOUT COMPLICATION: Status: RESOLVED | Noted: 2022-02-28 | Resolved: 2022-12-08

## 2022-12-08 PROBLEM — Z87.898 HISTORY OF SEIZURES: Status: RESOLVED | Noted: 2022-02-28 | Resolved: 2022-12-08

## 2022-12-08 PROBLEM — M54.50 ACUTE MIDLINE LOW BACK PAIN WITHOUT SCIATICA: Status: RESOLVED | Noted: 2022-02-28 | Resolved: 2022-12-08

## 2022-12-08 NOTE — PATIENT INSTRUCTIONS
Alireza por olguin confianza en nuestro equipo  Geeta Gather y agradecemos angela comentarios  Si recibe kofi encuesta nuestra, tómese unos momentos para informarnos cómo estamos     Sinceramente,  Geo AGNIESZKA Shaw

## 2022-12-15 ENCOUNTER — HOSPITAL ENCOUNTER (EMERGENCY)
Facility: HOSPITAL | Age: 27
Discharge: HOME | End: 2022-12-15
Attending: EMERGENCY MEDICINE

## 2022-12-15 VITALS
SYSTOLIC BLOOD PRESSURE: 123 MMHG | RESPIRATION RATE: 18 BRPM | BODY MASS INDEX: 18.95 KG/M2 | OXYGEN SATURATION: 99 % | DIASTOLIC BLOOD PRESSURE: 79 MMHG | WEIGHT: 103.62 LBS | HEART RATE: 85 BPM | TEMPERATURE: 98.1 F

## 2022-12-15 DIAGNOSIS — R11.2 NAUSEA AND VOMITING: Primary | ICD-10-CM

## 2022-12-15 LAB
BACTERIA UR QL AUTO: ABNORMAL /HPF
BILIRUB UR QL STRIP: NEGATIVE
CLARITY UR: ABNORMAL
COLOR UR: YELLOW
EXT PREGNANCY TEST URINE: NEGATIVE
EXT. CONTROL: NORMAL
FLUAV RNA RESP QL NAA+PROBE: NEGATIVE
FLUBV RNA RESP QL NAA+PROBE: NEGATIVE
GLUCOSE UR STRIP-MCNC: NEGATIVE MG/DL
HGB UR QL STRIP.AUTO: NEGATIVE
KETONES UR STRIP-MCNC: NEGATIVE MG/DL
LEUKOCYTE ESTERASE UR QL STRIP: NEGATIVE
MUCOUS THREADS UR QL AUTO: ABNORMAL
NITRITE UR QL STRIP: NEGATIVE
NON-SQ EPI CELLS URNS QL MICRO: ABNORMAL /HPF
PH UR STRIP.AUTO: 8 [PH]
PROT UR STRIP-MCNC: ABNORMAL MG/DL
RBC #/AREA URNS AUTO: ABNORMAL /HPF
RSV RNA RESP QL NAA+PROBE: NEGATIVE
SARS-COV-2 RNA RESP QL NAA+PROBE: NEGATIVE
SP GR UR STRIP.AUTO: 1.01 (ref 1–1.04)
UROBILINOGEN UA: 12 MG/DL
WBC #/AREA URNS AUTO: ABNORMAL /HPF

## 2022-12-15 RX ORDER — ONDANSETRON 4 MG/1
4 TABLET, FILM COATED ORAL EVERY 6 HOURS
Qty: 12 TABLET | Refills: 0 | Status: SHIPPED | OUTPATIENT
Start: 2022-12-15

## 2022-12-15 NOTE — Clinical Note
Ubaldo Thomas was seen and treated in our emergency department on 12/15/2022  Diagnosis:     Thiago Archer    She may return on this date: 12/18/2022         If you have any questions or concerns, please don't hesitate to call        Nancy Mari, DO    ______________________________           _______________          _______________  Hospital Representative                              Date                                Time

## 2022-12-15 NOTE — ED PROVIDER NOTES
History  Chief Complaint   Patient presents with   • Abdominal Pain     Abd pain and vomiting for 2 days     Patient is a 16-year-old female, Urdu-speaking, presents for nausea and vomiting that been present for approximately 2 days  Patient states over the last 2 days anytime she sees food or eats anything she vomits it back up  No associated abdominal pain, no fevers chills chest pain coughing shortness of breath  No dysuria or frequency no vaginal discharge or bleeding  States her last menstrual cycle was approximately 1 month ago and was normal   Has a history of tubal ligation per medical records  States this is never happened to her before  She is tried no medications at home prior to arrival   Denies sick contacts or recent travel outside [place]  None       Past Medical History:   Diagnosis Date   • Asthma    • Epilepsy ([facility] [address] )     last seizure 2022, no meds since 2022       Past Surgical History:   Procedure Laterality Date   • TUBAL LIGATION Bilateral 2017       Family History   Problem Relation Age of Onset   • Breast cancer Neg Hx    • Colon cancer Neg Hx    • Ovarian cancer Neg Hx    • Cancer Neg Hx      I have reviewed and agree with the history as documented  E-Cigarette/Vaping   • E-Cigarette Use Never User      E-Cigarette/Vaping Substances     Social History     Tobacco Use   • Smoking status: Former     Types: Cigarettes     Quit date: 2022     Years since quittin 3   • Smokeless tobacco: Never   Vaping Use   • Vaping Use: Never used   Substance Use Topics   • Alcohol use: Never   • Drug use: Never       Review of Systems   Constitutional: Negative  Negative for chills and fever  HENT: Negative  Negative for rhinorrhea, sore throat, trouble swallowing and voice change  Eyes: Negative  Negative for pain and visual disturbance  Respiratory: Negative  Negative for cough, shortness of breath and wheezing  Cardiovascular: Negative    Negative for chest pain and palpitations  Gastrointestinal: Positive for nausea and vomiting  Negative for abdominal pain and diarrhea  Genitourinary: Negative  Negative for dysuria and frequency  Musculoskeletal: Negative  Negative for neck pain and neck stiffness  Skin: Negative  Negative for rash  Neurological: Negative  Negative for dizziness, speech difficulty, weakness, light-headedness and numbness  Physical Exam  Physical Exam  Vitals and nursing note reviewed  Constitutional:       General: She is not in acute distress  Appearance: She is well-developed  HENT:      Head: Normocephalic and atraumatic  Eyes:      Conjunctiva/sclera: Conjunctivae normal       Pupils: Pupils are equal, round, and reactive to light  Neck:      Trachea: No tracheal deviation  Cardiovascular:      Rate and Rhythm: Normal rate and regular rhythm  Pulmonary:      Effort: Pulmonary effort is normal  No respiratory distress  Breath sounds: Normal breath sounds  No wheezing or rales  Abdominal:      General: Bowel sounds are normal  There is no distension  Palpations: Abdomen is soft  Tenderness: There is no abdominal tenderness  There is no guarding or rebound  Musculoskeletal:         General: No tenderness or deformity  Normal range of motion  Cervical back: Normal range of motion and neck supple  Skin:     General: Skin is warm and dry  Capillary Refill: Capillary refill takes less than 2 seconds  Findings: No rash  Neurological:      Mental Status: She is alert and oriented to person, place, and time     Psychiatric:         Behavior: Behavior normal          Vital Signs  ED Triage Vitals   Temperature Pulse Respirations Blood Pressure SpO2   12/15/22 1004 12/15/22 1004 12/15/22 1004 12/15/22 1004 12/15/22 1004   99 6 °F (37 6 °C) (!) 106 16 104/65 100 %      Temp Source Heart Rate Source Patient Position - Orthostatic VS BP Location FiO2 (%)   12/15/22 1004 12/15/22 1004 12/15/22 1004 12/15/22 1004 --   Oral Monitor Sitting Left arm       Pain Score       12/15/22 1229       No Pain           Vitals:    12/15/22 1004 12/15/22 1229   BP: 104/65 123/79   Pulse: (!) 106 85   Patient Position - Orthostatic VS: Sitting          Visual Acuity      ED Medications  Medications - No data to display    Diagnostic Studies  Results Reviewed     Procedure Component Value Units Date/Time    UA (URINE) with reflex to Scope [[id number]]  (Abnormal) Collected: 12/15/22 1115    Lab Status: Final result Specimen: Urine, Clean Catch Updated: 12/15/22 1210     Color, UA Yellow     Clarity, UA Slightly Cloudy     Specific Gravity, UA 1 015     pH, UA 8 0     Leukocytes, UA Negative     Nitrite, UA Negative     Protein, UA 15 (Trace) mg/dl      Glucose, UA Negative mg/dl      Ketones, UA Negative mg/dl      Bilirubin, UA Negative     Occult Blood, UA Negative     UROBILINOGEN UA 12 0 mg/dL     Urine Microscopic [[id number]] Collected: 12/15/22 1115    Lab Status: In process Specimen: Urine, Clean Catch Updated: 12/15/22 1209    FLU/RSV/COVID - if FLU/RSV clinically relevant [[id number]]  (Normal) Collected: 12/15/22 1046    Lab Status: Final result Specimen: Nares from Nose Updated: 12/15/22 1157     SARS-CoV-2 Negative     INFLUENZA A PCR Negative     INFLUENZA B PCR Negative     RSV PCR Negative    Narrative:      FOR PEDIATRIC PATIENTS - copy/paste COVID Guidelines URL to browser: https://"[employer]  [employer]    SARS-CoV-2 assay is a Nucleic Acid Amplification assay intended for the  qualitative detection of nucleic acid from SARS-CoV-2 in nasopharyngeal  swabs  Results are for the presumptive identification of SARS-CoV-2 RNA  Positive results are indicative of infection with SARS-CoV-2, the virus  causing COVID-19, but do not rule out bacterial infection or co-infection  with other viruses   Laboratories within the [place] and its  territories are required to report all positive results to the appropriate  public health authorities  Negative results do not preclude SARS-CoV-2  infection and should not be used as the sole basis for treatment or other  patient management decisions  Negative results must be combined with  clinical observations, patient history, and epidemiological information  This test has not been FDA cleared or approved  This test has been authorized by FDA under an Emergency Use Authorization  (EUA)  This test is only authorized for the duration of time the  declaration that circumstances exist justifying the authorization of the  emergency use of an in vitro diagnostic tests for detection of SARS-CoV-2  virus and/or diagnosis of COVID-19 infection under section 564(b)(1) of  the Act, 21 U  S C  621QAR-5(E)(0), unless the authorization is terminated  or revoked sooner  The test has been validated but independent review by FDA  and CLIA is pending  Test performed using [employer] GeneXpert: This RT-PCR assay targets N2,  a region unique to SARS-CoV-2  A conserved region in the E-gene was chosen  for pan-Sarbecovirus detection which includes SARS-CoV-2  According to CMS-2020-01-R, this platform meets the definition of high-throughput technology  POCT pregnancy, urine [[id number]]  (Normal) Resulted: 12/15/22 1116    Lab Status: Final result Updated: 12/15/22 1116     EXT Preg Test, Ur Negative     Control Valid                 No orders to display              Procedures  Procedures         ED Course                               SBIRT 20yo+    Flowsheet Row Most Recent Value   SBIRT (23 yo +)    In order to provide better care to our patients, we are screening all of our patients for alcohol and drug use  Would it be okay to ask you these screening questions? Yes Filed at: 12/15/2022 1020   Initial Alcohol Screen: US AUDIT-C     1  How often do you have a drink containing alcohol? 0 Filed at: 12/15/2022 1020   2   How many drinks containing alcohol do you have on a typical day you are drinking? 0 Filed at: 12/15/2022 1020   3b  FEMALE Any Age, or MALE 65+: How often do you have 4 or more drinks on one occassion? 0 Filed at: 12/15/2022 1020   Audit-C Score 0 Filed at: 12/15/2022 1020   TATA: How many times in the past year have you    Used an illegal drug or used a prescription medication for non-medical reasons? Never Filed at: 12/15/2022 1020                    MDM  Number of Diagnoses or Management Options  Nausea and vomiting  Diagnosis management comments: After further evaluation patient states that she had influenza 2 weeks ago  No test results can be seen to verify this in the medical record  We will repeat viral testing, obtain pregnancy test   Patient's abdominal exam is benign  We will try symptomatic management and if able to tolerate oral intake, patient can be discharged for outpatient follow-up  Amount and/or Complexity of Data Reviewed  Clinical lab tests: ordered and reviewed        Disposition  Final diagnoses:   Nausea and vomiting     Time reflects when diagnosis was documented in both MDM as applicable and the Disposition within this note     Time User Action Codes Description Comment    12/15/2022 11:59 AM Daisy Lerma Add [R11 2] Nausea and vomiting       ED Disposition     ED Disposition   Discharge    Condition   Stable    Date/Time   Thu Dec 15, 2022 11:59 AM    Comment   Asif Durham discharge to home/self care                 Follow-up Information     Follow up With Specialties Details Why Contact Info Additional [address] In 1 week  [address], [address] [facility] [address]  [address] [place] [address], [address], [address] [place], [place], [address] [facility]    [facility] [place] [facility]   [phone] [facility] [address] [facility] [address]  Kassie Kaye [address] [facility] [address], [address] [facility] [address], [address], [place], [address] [phone]          Discharge Medication List as of 12/15/2022 12:01 PM      START taking these medications    Details   ondansetron (ZOFRAN) 4 mg tablet Take 1 tablet (4 mg total) by mouth every 6 (six) hours, Starting Thu 12/15/2022, Normal             No discharge procedures on file      PDMP Review     None          ED Provider  Electronically Signed by           Ron Wilson DO  12/15/22 1240

## 2022-12-20 LAB
LAB AP GYN PRIMARY INTERPRETATION: ABNORMAL
Lab: ABNORMAL
PATH INTERP SPEC-IMP: ABNORMAL

## 2022-12-21 ENCOUNTER — TELEPHONE (OUTPATIENT)
Dept: OBGYN CLINIC | Facility: CLINIC | Age: 27
End: 2022-12-21

## 2022-12-21 NOTE — TELEPHONE ENCOUNTER
Please contact patient in [place] and advise her that pap smear was abnormal and she needs to have colposcopy  Please assist her with scheduling the colposcopy

## 2022-12-22 ENCOUNTER — TELEPHONE (OUTPATIENT)
Dept: OBGYN CLINIC | Facility: CLINIC | Age: 27
End: 2022-12-22

## 2023-01-09 ENCOUNTER — TELEPHONE (OUTPATIENT)
Dept: OBGYN CLINIC | Facility: CLINIC | Age: 28
End: 2023-01-09

## 2023-01-25 ENCOUNTER — OFFICE VISIT (OUTPATIENT)
Dept: OBGYN CLINIC | Facility: CLINIC | Age: 28
End: 2023-01-25

## 2023-01-25 ENCOUNTER — APPOINTMENT (OUTPATIENT)
Dept: LAB | Facility: CLINIC | Age: 28
End: 2023-01-25

## 2023-01-25 VITALS
HEIGHT: 62 IN | SYSTOLIC BLOOD PRESSURE: 118 MMHG | DIASTOLIC BLOOD PRESSURE: 80 MMHG | WEIGHT: 107 LBS | HEART RATE: 86 BPM | BODY MASS INDEX: 19.69 KG/M2

## 2023-01-25 DIAGNOSIS — N92.6 IRREGULAR PERIODS: Primary | ICD-10-CM

## 2023-01-25 DIAGNOSIS — N92.6 IRREGULAR PERIODS: ICD-10-CM

## 2023-01-25 DIAGNOSIS — Z13.29 SCREENING FOR THYROID DISORDER: ICD-10-CM

## 2023-01-25 DIAGNOSIS — Z13.1 SCREENING FOR DIABETES MELLITUS: ICD-10-CM

## 2023-01-25 DIAGNOSIS — Z11.4 SCREENING FOR HIV (HUMAN IMMUNODEFICIENCY VIRUS): ICD-10-CM

## 2023-01-25 DIAGNOSIS — Z87.898 HISTORY OF SEIZURES: ICD-10-CM

## 2023-01-25 DIAGNOSIS — Z11.59 ENCOUNTER FOR HEPATITIS C SCREENING TEST FOR LOW RISK PATIENT: ICD-10-CM

## 2023-01-25 LAB
ANION GAP SERPL CALCULATED.3IONS-SCNC: 2 MMOL/L (ref 4–13)
BASOPHILS # BLD AUTO: 0.05 THOUSANDS/ÂΜL (ref 0–0.1)
BASOPHILS NFR BLD AUTO: 1 % (ref 0–1)
BUN SERPL-MCNC: 12 MG/DL (ref 5–25)
CALCIUM SERPL-MCNC: 9.4 MG/DL (ref 8.3–10.1)
CHLORIDE SERPL-SCNC: 107 MMOL/L (ref 96–108)
CO2 SERPL-SCNC: 29 MMOL/L (ref 21–32)
CREAT SERPL-MCNC: 0.51 MG/DL (ref 0.6–1.3)
EOSINOPHIL # BLD AUTO: 0.32 THOUSAND/ÂΜL (ref 0–0.61)
EOSINOPHIL NFR BLD AUTO: 4 % (ref 0–6)
ERYTHROCYTE [DISTWIDTH] IN BLOOD BY AUTOMATED COUNT: 12.8 % (ref 11.6–15.1)
EST. AVERAGE GLUCOSE BLD GHB EST-MCNC: 91 MG/DL
GFR SERPL CREATININE-BSD FRML MDRD: 133 ML/MIN/1.73SQ M
GLUCOSE P FAST SERPL-MCNC: 75 MG/DL (ref 65–99)
HBA1C MFR BLD: 4.8 %
HCT VFR BLD AUTO: 39.7 % (ref 34.8–46.1)
HGB BLD-MCNC: 13 G/DL (ref 11.5–15.4)
IMM GRANULOCYTES # BLD AUTO: 0.04 THOUSAND/UL (ref 0–0.2)
IMM GRANULOCYTES NFR BLD AUTO: 1 % (ref 0–2)
LYMPHOCYTES # BLD AUTO: 1.55 THOUSANDS/ÂΜL (ref 0.6–4.47)
LYMPHOCYTES NFR BLD AUTO: 20 % (ref 14–44)
MAGNESIUM SERPL-MCNC: 2.3 MG/DL (ref 1.6–2.6)
MCH RBC QN AUTO: 29.3 PG (ref 26.8–34.3)
MCHC RBC AUTO-ENTMCNC: 32.7 G/DL (ref 31.4–37.4)
MCV RBC AUTO: 90 FL (ref 82–98)
MONOCYTES # BLD AUTO: 0.5 THOUSAND/ÂΜL (ref 0.17–1.22)
MONOCYTES NFR BLD AUTO: 6 % (ref 4–12)
NEUTROPHILS # BLD AUTO: 5.47 THOUSANDS/ÂΜL (ref 1.85–7.62)
NEUTS SEG NFR BLD AUTO: 68 % (ref 43–75)
NRBC BLD AUTO-RTO: 0 /100 WBCS
PLATELET # BLD AUTO: 250 THOUSANDS/UL (ref 149–390)
PMV BLD AUTO: 12 FL (ref 8.9–12.7)
POTASSIUM SERPL-SCNC: 3.8 MMOL/L (ref 3.5–5.3)
RBC # BLD AUTO: 4.43 MILLION/UL (ref 3.81–5.12)
SODIUM SERPL-SCNC: 138 MMOL/L (ref 135–147)
T4 FREE SERPL-MCNC: 0.91 NG/DL (ref 0.76–1.46)
TSH SERPL DL<=0.05 MIU/L-ACNC: 0.76 UIU/ML (ref 0.45–4.5)
WBC # BLD AUTO: 7.93 THOUSAND/UL (ref 4.31–10.16)

## 2023-01-25 NOTE — PROGRESS NOTES
Subjective:     Khmer interpretor used for translation     Rosetta Hdez is a 33 y o  [id number] female who presents with desire for pregnancy with history of BTL in 2017 in [place]  Reports that she was told that the tubes were tied but not removed  Her Ob history is significant for 3 SVDs prior to her BTL  She then had a spontaneous pregnancy that made it to about 4 months when she had a miscarriage after falling down the steps and passed the fetus at home  This occurred in September of 2022  She reportedly went to a [facility] facility for bleeding following this miscarriage, however there are no records present in the chart  Of note, the patient was able to show me pictures of the fetus and placenta that she passed at home  Prior to this, her periods have always been very regular and occur monthly  After the miscarriage, she had normal periods and then started having irregular bleeding in November and December  She will have bleeding and spotting a few times a month for a few days each time  She does have some pelvic pain with the bleeding  Last pap was LSIL 12/8/22  She had this done during her annual and had a normal exam at that time  Objective:    Vitals: Blood pressure 118/80, pulse 86, height 5' 2" (1 575 m), weight 48 5 kg (107 lb), last menstrual period 01/24/2023, not currently breastfeeding  Body mass index is 19 57 kg/m²  Physical Exam  Constitutional:       General: She is not in acute distress  Appearance: She is not ill-appearing or toxic-appearing  HENT:      Head: Normocephalic and atraumatic  Cardiovascular:      Rate and Rhythm: Normal rate  Pulmonary:      Effort: Pulmonary effort is normal  No respiratory distress  Breath sounds: No stridor  Neurological:      General: No focal deficit present  Mental Status: She is alert and oriented to person, place, and time  Psychiatric:         Mood and Affect: Mood normal          Thought Content:  Thought content normal          Judgment: Judgment normal          Assessment  Samy Blas is a 32 yo who presents to discuss desire for pregnancy in setting of prior BTL  She has achieved spontaneous pregnancy following her BTL with a miscarriage following a traumatic fall  We discussed that one or both of her fallopian tubes may have growth back together and allowed for spontaneous conception  In regards to her recent irregular menses and mild pelvic pain, will order a TVUS and TSH  Patient will also need colposcopy given recent LSIL without HPV testing  Plan  · TVUS and TSH ordered  · RTO for colposcopy  Following these results, will discuss proceeding with menses induction and HSG vs hysteroscopy D&C with chromopertubation  May also consider SIS in the office       D/w Dr Ezequiel Medellin MD  1/25/2023  4:52 PM

## 2023-01-26 LAB
HCV AB SER QL: NORMAL
HIV 1+2 AB+HIV1 P24 AG SERPL QL IA: NORMAL
HIV 2 AB SERPL QL IA: NORMAL
HIV1 AB SERPL QL IA: NORMAL
HIV1 P24 AG SERPL QL IA: NORMAL

## 2023-02-22 ENCOUNTER — TELEPHONE (OUTPATIENT)
Dept: OBGYN CLINIC | Facility: CLINIC | Age: 28
End: 2023-02-22

## 2023-03-15 ENCOUNTER — VBI (OUTPATIENT)
Dept: ADMINISTRATIVE | Facility: OTHER | Age: 28
End: 2023-03-15

## 2023-03-18 ENCOUNTER — HOSPITAL ENCOUNTER (EMERGENCY)
Facility: HOSPITAL | Age: 28
Discharge: HOME | End: 2023-03-19
Attending: EMERGENCY MEDICINE

## 2023-03-18 VITALS
TEMPERATURE: 97.5 F | BODY MASS INDEX: 18.83 KG/M2 | DIASTOLIC BLOOD PRESSURE: 77 MMHG | OXYGEN SATURATION: 100 % | RESPIRATION RATE: 18 BRPM | HEART RATE: 86 BPM | WEIGHT: 102.95 LBS | SYSTOLIC BLOOD PRESSURE: 118 MMHG

## 2023-03-18 DIAGNOSIS — R11.10 VOMITING AND DIARRHEA: Primary | ICD-10-CM

## 2023-03-18 DIAGNOSIS — R10.9 ABDOMINAL PAIN: ICD-10-CM

## 2023-03-18 DIAGNOSIS — R19.7 VOMITING AND DIARRHEA: Primary | ICD-10-CM

## 2023-03-18 LAB
ALBUMIN SERPL BCP-MCNC: 5 G/DL (ref 3.5–5)
ALP SERPL-CCNC: 51 U/L (ref 34–104)
ALT SERPL W P-5'-P-CCNC: 8 U/L (ref 7–52)
ANION GAP SERPL CALCULATED.3IONS-SCNC: 8 MMOL/L (ref 4–13)
AST SERPL W P-5'-P-CCNC: 13 U/L (ref 13–39)
BASOPHILS # BLD AUTO: 0.05 THOUSANDS/ÂΜL (ref 0–0.1)
BASOPHILS NFR BLD AUTO: 0 % (ref 0–1)
BILIRUB SERPL-MCNC: 0.57 MG/DL (ref 0.2–1)
BUN SERPL-MCNC: 12 MG/DL (ref 5–25)
CALCIUM SERPL-MCNC: 9.5 MG/DL (ref 8.4–10.2)
CHLORIDE SERPL-SCNC: 106 MMOL/L (ref 96–108)
CO2 SERPL-SCNC: 24 MMOL/L (ref 21–32)
CREAT SERPL-MCNC: 0.53 MG/DL (ref 0.6–1.3)
EOSINOPHIL # BLD AUTO: 0.19 THOUSAND/ÂΜL (ref 0–0.61)
EOSINOPHIL NFR BLD AUTO: 1 % (ref 0–6)
ERYTHROCYTE [DISTWIDTH] IN BLOOD BY AUTOMATED COUNT: 12 % (ref 11.6–15.1)
GFR SERPL CREATININE-BSD FRML MDRD: 131 ML/MIN/1.73SQ M
GLUCOSE SERPL-MCNC: 99 MG/DL (ref 65–140)
HCT VFR BLD AUTO: 41.9 % (ref 34.8–46.1)
HGB BLD-MCNC: 14 G/DL (ref 11.5–15.4)
IMM GRANULOCYTES # BLD AUTO: 0.09 THOUSAND/UL (ref 0–0.2)
IMM GRANULOCYTES NFR BLD AUTO: 1 % (ref 0–2)
LIPASE SERPL-CCNC: 12 U/L (ref 11–82)
LYMPHOCYTES # BLD AUTO: 1.43 THOUSANDS/ÂΜL (ref 0.6–4.47)
LYMPHOCYTES NFR BLD AUTO: 9 % (ref 14–44)
MCH RBC QN AUTO: 29.2 PG (ref 26.8–34.3)
MCHC RBC AUTO-ENTMCNC: 33.4 G/DL (ref 31.4–37.4)
MCV RBC AUTO: 88 FL (ref 82–98)
MONOCYTES # BLD AUTO: 0.94 THOUSAND/ÂΜL (ref 0.17–1.22)
MONOCYTES NFR BLD AUTO: 6 % (ref 4–12)
NEUTROPHILS # BLD AUTO: 13.15 THOUSANDS/ÂΜL (ref 1.85–7.62)
NEUTS SEG NFR BLD AUTO: 83 % (ref 43–75)
NRBC BLD AUTO-RTO: 0 /100 WBCS
PLATELET # BLD AUTO: 229 THOUSANDS/UL (ref 149–390)
PMV BLD AUTO: 11.2 FL (ref 8.9–12.7)
POTASSIUM SERPL-SCNC: 3.9 MMOL/L (ref 3.5–5.3)
PROT SERPL-MCNC: 8.3 G/DL (ref 6.4–8.4)
RBC # BLD AUTO: 4.79 MILLION/UL (ref 3.81–5.12)
SODIUM SERPL-SCNC: 138 MMOL/L (ref 135–147)
WBC # BLD AUTO: 15.85 THOUSAND/UL (ref 4.31–10.16)

## 2023-03-18 RX ORDER — LOPERAMIDE HYDROCHLORIDE 2 MG/1
2 CAPSULE ORAL ONCE
Status: COMPLETED | OUTPATIENT
Start: 2023-03-18 | End: 2023-03-18

## 2023-03-18 RX ORDER — SODIUM CHLORIDE, SODIUM GLUCONATE, SODIUM ACETATE, POTASSIUM CHLORIDE, MAGNESIUM CHLORIDE, SODIUM PHOSPHATE, DIBASIC, AND POTASSIUM PHOSPHATE .53; .5; .37; .037; .03; .012; .00082 G/100ML; G/100ML; G/100ML; G/100ML; G/100ML; G/100ML; G/100ML
1000 INJECTION, SOLUTION INTRAVENOUS ONCE
Status: COMPLETED | OUTPATIENT
Start: 2023-03-18 | End: 2023-03-19

## 2023-03-18 RX ORDER — ONDANSETRON 4 MG/1
4 TABLET, ORALLY DISINTEGRATING ORAL ONCE
Status: COMPLETED | OUTPATIENT
Start: 2023-03-18 | End: 2023-03-18

## 2023-03-18 RX ADMIN — SODIUM CHLORIDE, SODIUM GLUCONATE, SODIUM ACETATE, POTASSIUM CHLORIDE, MAGNESIUM CHLORIDE, SODIUM PHOSPHATE, DIBASIC, AND POTASSIUM PHOSPHATE 1000 ML: .53; .5; .37; .037; .03; .012; .00082 INJECTION, SOLUTION INTRAVENOUS at 23:27

## 2023-03-18 RX ADMIN — LOPERAMIDE HYDROCHLORIDE 2 MG: 2 CAPSULE ORAL at 23:24

## 2023-03-18 RX ADMIN — ONDANSETRON 4 MG: 4 TABLET, ORALLY DISINTEGRATING ORAL at 23:23

## 2023-03-19 LAB
FLUAV RNA RESP QL NAA+PROBE: NEGATIVE
FLUBV RNA RESP QL NAA+PROBE: NEGATIVE
RSV RNA RESP QL NAA+PROBE: NEGATIVE
SARS-COV-2 RNA RESP QL NAA+PROBE: NEGATIVE

## 2023-03-19 RX ORDER — ONDANSETRON 4 MG/1
4 TABLET, FILM COATED ORAL EVERY 6 HOURS
Qty: 8 TABLET | Refills: 0 | Status: SHIPPED | OUTPATIENT
Start: 2023-03-19

## 2023-03-19 RX ORDER — LOPERAMIDE HYDROCHLORIDE 2 MG/1
2 CAPSULE ORAL 4 TIMES DAILY PRN
Qty: 8 CAPSULE | Refills: 0 | Status: SHIPPED | OUTPATIENT
Start: 2023-03-19

## 2023-03-19 NOTE — ED PROVIDER NOTES
History  Chief Complaint   Patient presents with   • Vomiting     Patient reports vomiting, diarrhea, generalized abdominal pain for 2 hours  Patient 71-year-old female who presents to the ER with 2 hours of abdominal pain, vomiting and diarrhea  Patient states that she suddenly began vomiting and had diarrhea not associated with any foods that she is recently  She has not been able to keep down any fluids  Approximately an hour ago she attempted to drink some Gatorade but vomited  Her abdominal pain generalized and feels better after bowel movement but every 10 minutes or so she has to go to the bathroom  She denies recent sickness or sick contacts  He has not taken any medications  Her last menstrual period was 1 week ago  She denies fever, chills, upper respiratory symptoms, dysuria, hematuria, hematochezia, hematemesis, headache or dizziness          Prior to Admission Medications   Prescriptions Last Dose Informant Patient Reported? Taking?   ondansetron (ZOFRAN) 4 mg tablet   No No   Sig: Take 1 tablet (4 mg total) by mouth every 6 (six) hours      Facility-Administered Medications: None       Past Medical History:   Diagnosis Date   • Abnormal Pap smear of cervix     2022 LSIL pap   • Asthma    • Epilepsy ([facility] [address] )     last seizure 2022, no meds since 2022       Past Surgical History:   Procedure Laterality Date   • TUBAL LIGATION Bilateral 2017       Family History   Problem Relation Age of Onset   • Breast cancer Neg Hx    • Colon cancer Neg Hx    • Ovarian cancer Neg Hx    • Cancer Neg Hx      I have reviewed and agree with the history as documented      E-Cigarette/Vaping   • E-Cigarette Use Never User      E-Cigarette/Vaping Substances     Social History     Tobacco Use   • Smoking status: Former     Types: Cigarettes     Quit date: 2022     Years since quittin 6   • Smokeless tobacco: Never   Vaping Use   • Vaping Use: Never used   Substance Use Topics   • Alcohol use: Never   • Drug use: Never       Review of Systems   Constitutional: Negative for chills and fever  HENT: Negative for congestion, rhinorrhea and sore throat  Eyes: Negative for pain and visual disturbance  Respiratory: Negative for cough and shortness of breath  Cardiovascular: Negative for chest pain and palpitations  Gastrointestinal: Positive for abdominal pain, diarrhea and vomiting  Negative for blood in stool  Genitourinary: Negative for dysuria and hematuria  Musculoskeletal: Negative for arthralgias and back pain  Skin: Negative for color change and rash  Neurological: Negative for dizziness, seizures, syncope and headaches  All other systems reviewed and are negative  Physical Exam  Physical Exam  Vitals and nursing note reviewed  Constitutional:       General: She is not in acute distress  Appearance: She is well-developed  She is not diaphoretic  HENT:      Head: Normocephalic and atraumatic  Mouth/Throat:      Mouth: Mucous membranes are moist       Pharynx: Oropharynx is clear  No oropharyngeal exudate or posterior oropharyngeal erythema  Eyes:      Extraocular Movements: Extraocular movements intact  Conjunctiva/sclera: Conjunctivae normal    Cardiovascular:      Rate and Rhythm: Normal rate and regular rhythm  Heart sounds: Normal heart sounds  No murmur heard  Pulmonary:      Effort: Pulmonary effort is normal  No respiratory distress  Breath sounds: Normal breath sounds  No wheezing or rales  Abdominal:      General: Bowel sounds are normal  There is no distension  Palpations: Abdomen is soft  Tenderness: There is generalized abdominal tenderness  There is no guarding  Musculoskeletal:      Cervical back: Neck supple  Skin:     General: Skin is warm and dry  Capillary Refill: Capillary refill takes less than 2 seconds  Neurological:      Mental Status: She is alert and oriented to person, place, and time     Psychiatric: Mood and Affect: Mood normal          Behavior: Behavior normal          Vital Signs  ED Triage Vitals [03/18/23 2249]   Temperature Pulse Respirations Blood Pressure SpO2   97 5 °F (36 4 °C) 86 18 118/77 100 %      Temp Source Heart Rate Source Patient Position - Orthostatic VS BP Location FiO2 (%)   Oral Monitor Sitting Right arm --      Pain Score       10 - Worst Possible Pain           Vitals:    03/18/23 2249   BP: 118/77   Pulse: 86   Patient Position - Orthostatic VS: Sitting         Visual Acuity      ED Medications  Medications   ondansetron (ZOFRAN-ODT) dispersible tablet 4 mg (4 mg Oral Given 3/18/23 2323)   multi-electrolyte (ISOLYTE-S PH 7 4) bolus 1,000 mL (0 mL Intravenous Stopped 3/19/23 0046)   loperamide (IMODIUM) capsule 2 mg (2 mg Oral Given 3/18/23 2324)       Diagnostic Studies  Results Reviewed     Procedure Component Value Units Date/Time    FLU/RSV/COVID - if FLU/RSV clinically relevant [[id number]]  (Normal) Collected: 03/18/23 2328    Lab Status: Final result Specimen: Nares from Nose Updated: 03/19/23 0009     SARS-CoV-2 Negative     INFLUENZA A PCR Negative     INFLUENZA B PCR Negative     RSV PCR Negative    Narrative:      FOR PEDIATRIC PATIENTS - copy/paste COVID Guidelines URL to browser: [email or web address] [email or web address]  [employer]    SARS-CoV-2 assay is a Nucleic Acid Amplification assay intended for the  qualitative detection of nucleic acid from SARS-CoV-2 in nasopharyngeal  swabs  Results are for the presumptive identification of SARS-CoV-2 RNA  Positive results are indicative of infection with SARS-CoV-2, the virus  causing COVID-19, but do not rule out bacterial infection or co-infection  with other viruses  Laboratories within the [place] and its  territories are required to report all positive results to the appropriate  public health authorities   Negative results do not preclude SARS-CoV-2  infection and should not be used as the sole basis for treatment or other  patient management decisions  Negative results must be combined with  clinical observations, patient history, and epidemiological information  This test has not been FDA cleared or approved  This test has been authorized by FDA under an Emergency Use Authorization  (EUA)  This test is only authorized for the duration of time the  declaration that circumstances exist justifying the authorization of the  emergency use of an in vitro diagnostic tests for detection of SARS-CoV-2  virus and/or diagnosis of COVID-19 infection under section 564(b)(1) of  the Act, 21 U  S C  953DZM-6(I)(6), unless the authorization is terminated  or revoked sooner  The test has been validated but independent review by FDA  and CLIA is pending  Test performed using [employer] GeneXpert: This RT-PCR assay targets N2,  a region unique to SARS-CoV-2  A conserved region in the E-gene was chosen  for pan-Sarbecovirus detection which includes SARS-CoV-2  According to CMS-2020-01-R, this platform meets the definition of high-throughput technology      Lipase [[id number]]  (Normal) Collected: 03/18/23 2322    Lab Status: Final result Specimen: Blood from Arm, Right Updated: 03/18/23 2349     Lipase 12 u/L     Comprehensive metabolic panel [[id number]]  (Abnormal) Collected: 03/18/23 2322    Lab Status: Final result Specimen: Blood from Arm, Right Updated: 03/18/23 2349     Sodium 138 mmol/L      Potassium 3 9 mmol/L      Chloride 106 mmol/L      CO2 24 mmol/L      ANION GAP 8 mmol/L      BUN 12 mg/dL      Creatinine 0 53 mg/dL      Glucose 99 mg/dL      Calcium 9 5 mg/dL      AST 13 U/L      ALT 8 U/L      Alkaline Phosphatase 51 U/L      Total Protein 8 3 g/dL      Albumin 5 0 g/dL      Total Bilirubin 0 57 mg/dL      eGFR 131 ml/min/1 73sq m     Narrative:      [place] guidelines for Chronic Kidney Disease (CKD):   •  Stage 1 with normal or high GFR (GFR > 90 mL/min/1 73 square meters)  • Stage 2 Mild CKD (GFR = 60-89 mL/min/1 73 square meters)  •  Stage 3A Moderate CKD (GFR = 45-59 mL/min/1 73 square meters)  •  Stage 3B Moderate CKD (GFR = 30-44 mL/min/1 73 square meters)  •  Stage 4 Severe CKD (GFR = 15-29 mL/min/1 73 square meters)  •  Stage 5 End Stage CKD (GFR <15 mL/min/1 73 square meters)  Note: GFR calculation is accurate only with a steady state creatinine    CBC and differential [[id number]]  (Abnormal) Collected: 03/18/23 2322    Lab Status: Final result Specimen: Blood from Arm, Right Updated: 03/18/23 2333     WBC 15 85 Thousand/uL      RBC 4 79 Million/uL      Hemoglobin 14 0 g/dL      Hematocrit 41 9 %      MCV 88 fL      MCH 29 2 pg      MCHC 33 4 g/dL      RDW 12 0 %      MPV 11 2 fL      Platelets 483 Thousands/uL      nRBC 0 /100 WBCs      Neutrophils Relative 83 %      Immat GRANS % 1 %      Lymphocytes Relative 9 %      Monocytes Relative 6 %      Eosinophils Relative 1 %      Basophils Relative 0 %      Neutrophils Absolute 13 15 Thousands/µL      Immature Grans Absolute 0 09 Thousand/uL      Lymphocytes Absolute 1 43 Thousands/µL      Monocytes Absolute 0 94 Thousand/µL      Eosinophils Absolute 0 19 Thousand/µL      Basophils Absolute 0 05 Thousands/µL     UA w Reflex to Microscopic w Reflex to Culture [[id number]]     Lab Status: No result Specimen: Urine     POCT pregnancy, urine [[id number]]     Lab Status: No result                  No orders to display              Procedures  Procedures         ED Course               Medical Decision Making  Patient is 60-year-old female who presents with abdominal pain, vomiting and diarrhea  Vitals are normal   Physical exam positive for generalized tenderness  No signs of dehydration  Patient given IV Isolyte, Zofran, loperamide  Patient feeling better after medications and fluids  She denies abdominal pain, nausea, vomiting and diarrhea  Patient is able to keep down orange juice, water and crackers    Given referral to primary care   If symptoms worsen, patient advised to return to ER  Amount and/or Complexity of Data Reviewed  Labs: ordered  Risk  Prescription drug management  Disposition  Final diagnoses:   Vomiting and diarrhea   Abdominal pain     Time reflects when diagnosis was documented in both MDM as applicable and the Disposition within this note     Time User Action Codes Description Comment    3/19/2023 12:36 AM Azell Maxin Add [R11 10,  R19 7] Vomiting and diarrhea     3/19/2023 12:36 AM Azell Maxin Add [R10 9] Abdominal pain       ED Disposition     ED Disposition   Discharge    Condition   Stable    Date/Time   Sun Mar 19, 2023 12:37 AM    Comment   [place] Parent discharge to home/self care  Follow-up Information     Follow up With Specialties Details Why Contact Info    Rehab Martine Aguirre DO Family Medicine Schedule an appointment as soon as possible for a visit in 1 week  [address]  [address] [address]  [phone]            Discharge Medication List as of 3/19/2023 12:41 AM      START taking these medications    Details   loperamide (IMODIUM) 2 mg capsule Take 1 capsule (2 mg total) by mouth 4 (four) times a day as needed for diarrhea, Starting Sun 3/19/2023, Normal      !! ondansetron (ZOFRAN) 4 mg tablet Take 1 tablet (4 mg total) by mouth every 6 (six) hours, Starting [place] 3/19/2023, Normal       !! - Potential duplicate medications found  Please discuss with provider  CONTINUE these medications which have NOT CHANGED    Details   !! ondansetron (ZOFRAN) 4 mg tablet Take 1 tablet (4 mg total) by mouth every 6 (six) hours, Starting Thu 12/15/2022, Normal       !! - Potential duplicate medications found  Please discuss with provider  No discharge procedures on file      PDMP Review     None          ED Provider  Electronically Signed by           Vee Suárez PA-C  03/19/23 0216

## 2023-06-07 ENCOUNTER — TELEPHONE (OUTPATIENT)
Dept: OBGYN CLINIC | Facility: CLINIC | Age: 28
End: 2023-06-07

## 2023-11-09 ENCOUNTER — OFFICE VISIT (OUTPATIENT)
Dept: URGENT CARE | Facility: CLINIC | Age: 28
End: 2023-11-09
Payer: COMMERCIAL

## 2023-11-09 VITALS
BODY MASS INDEX: 20.16 KG/M2 | DIASTOLIC BLOOD PRESSURE: 68 MMHG | WEIGHT: 110.2 LBS | RESPIRATION RATE: 20 BRPM | TEMPERATURE: 97.7 F | SYSTOLIC BLOOD PRESSURE: 105 MMHG | OXYGEN SATURATION: 99 % | HEART RATE: 96 BPM

## 2023-11-09 DIAGNOSIS — B34.9 VIRAL ILLNESS: Primary | ICD-10-CM

## 2023-11-09 DIAGNOSIS — Z20.822 CLOSE EXPOSURE TO COVID-19 VIRUS: ICD-10-CM

## 2023-11-09 LAB
SARS-COV-2 AG UPPER RESP QL IA: NEGATIVE
VALID CONTROL: NORMAL

## 2023-11-09 PROCEDURE — 99212 OFFICE O/P EST SF 10 MIN: CPT | Performed by: PHYSICIAN ASSISTANT

## 2023-11-09 PROCEDURE — 87811 SARS-COV-2 COVID19 W/OPTIC: CPT | Performed by: PHYSICIAN ASSISTANT

## 2023-11-09 NOTE — PATIENT INSTRUCTIONS
If symptoms progress test yourself for Covid on day 3 of symptoms of later, you can call your PCP to have test done  Tylenol or Ibuprofen as needed for fever or pain  Drink plenty of fluids  Over the Counter cold medication to control symptoms  If symptoms fail to improve follow up with PCP  If symptoms worsen have yourself rechecked

## 2023-11-09 NOTE — PROGRESS NOTES
[facility]        NAME: Taras Reese is a 33 y.o. female  : 1995    MRN: [id number]  DATE: 2023  TIME: 10:32 AM    Assessment and Plan   Viral illness [B34.9]  1. Viral illness        2. Close exposure to COVID-19 virus  Poct Covid 19 Rapid Antigen Test            Patient Instructions     If symptoms progress test yourself for Covid on day 3 of symptoms of later, you can call your PCP to have test done  Tylenol or Ibuprofen as needed for fever or pain  Drink plenty of fluids  Over the Counter cold medication to control symptoms  If symptoms fail to improve follow up with PCP  If symptoms worsen have yourself rechecked  Follow up with PCP in 3-5 days. Proceed to  ER if symptoms worsen. Chief Complaint     Chief Complaint   Patient presents with   • Cold Like Symptoms     Back pain, diarrhea, headache. Started during the night. History of Present Illness       Patient presents with fatigue, diarrhea, back pain and a headache which started last night. She denies, fever, chills, Runny, stuffy nose, sore throat, cough, nausea, vomiting or abd pain. 20 people at work tested positive for covid. Review of Systems   Review of Systems   Constitutional:  Positive for fatigue. Negative for chills and fever. HENT:  Negative for congestion, rhinorrhea and sore throat. Respiratory:  Negative for cough. Gastrointestinal:  Positive for diarrhea. Negative for nausea and vomiting. Musculoskeletal:  Positive for back pain. Negative for myalgias. Neurological:  Positive for headaches.          Current Medications       Current Outpatient Medications:   •  loperamide (IMODIUM) 2 mg capsule, Take 1 capsule (2 mg total) by mouth 4 (four) times a day as needed for diarrhea (Patient not taking: Reported on 2023), Disp: 8 capsule, Rfl: 0  •  ondansetron (ZOFRAN) 4 mg tablet, Take 1 tablet (4 mg total) by mouth every 6 (six) hours (Patient not taking: Reported on 11/9/2023), Disp: 12 tablet, Rfl: 0  •  ondansetron (ZOFRAN) 4 mg tablet, Take 1 tablet (4 mg total) by mouth every 6 (six) hours (Patient not taking: Reported on 11/9/2023), Disp: 8 tablet, Rfl: 0    Current Allergies     Allergies as of 11/09/2023   • (No Known Allergies)            The following portions of the patient's history were reviewed and updated as appropriate: allergies, current medications, past family history, past medical history, past social history, past surgical history and problem list.     Past Medical History:   Diagnosis Date   • Abnormal Pap smear of cervix     12/2022 LSIL pap   • Asthma    • Epilepsy ([address])     last seizure 7/2022, no meds since 7/2022       Past Surgical History:   Procedure Laterality Date   • TUBAL LIGATION Bilateral 04/2017       Family History   Problem Relation Age of Onset   • Breast cancer Neg Hx    • Colon cancer Neg Hx    • Ovarian cancer Neg Hx    • Cancer Neg Hx          Medications have been verified. Objective   /68   Pulse 96   Temp 97.7 °F (36.5 °C)   Resp 20   Wt 50 kg (110 lb 3.2 oz)   SpO2 99%   BMI 20.16 kg/m²   No LMP recorded. Physical Exam     Physical Exam  Vitals and nursing note reviewed. Constitutional:       Appearance: Normal appearance. HENT:      Head: Normocephalic and atraumatic. Right Ear: Tympanic membrane normal.      Left Ear: Tympanic membrane normal.      Nose: Nose normal.      Mouth/Throat:      Mouth: Mucous membranes are moist.      Pharynx: Oropharynx is clear. Eyes:      Conjunctiva/sclera: Conjunctivae normal.   Cardiovascular:      Rate and Rhythm: Normal rate and regular rhythm. Heart sounds: Normal heart sounds. Pulmonary:      Effort: Pulmonary effort is normal.      Breath sounds: Normal breath sounds. Abdominal:      General: Abdomen is flat. Tenderness: There is no abdominal tenderness. Musculoskeletal:      Cervical back: Neck supple.    Lymphadenopathy:      Cervical: No cervical adenopathy. Skin:     General: Skin is warm. Neurological:      Mental Status: She is alert.

## 2023-11-09 NOTE — LETTER
November 9, 2023     Patient: Gilford Bramble   YOB: 1995   Date of Visit: 11/9/2023       To Whom It May Concern: It is my medical opinion that Gilford Bramble may return to work on 11/10/23 . Covid testing on 11/09/23 was negative, however, she may convert but needs time for symptoms to manifest longer than 24 hours. Accuracy of the test is better achieved after 72 hours of symptoms. If you have any questions or concerns, please don't hesitate to call.          Sincerely,        Theo Dangelo PA-C    CC: No Recipients

## 2023-12-11 ENCOUNTER — TELEPHONE (OUTPATIENT)
Dept: OBGYN CLINIC | Facility: CLINIC | Age: 28
End: 2023-12-11

## 2023-12-14 ENCOUNTER — OFFICE VISIT (OUTPATIENT)
Dept: URGENT CARE | Facility: CLINIC | Age: 28
End: 2023-12-14
Payer: COMMERCIAL

## 2023-12-14 VITALS
WEIGHT: 110 LBS | TEMPERATURE: 98.6 F | HEART RATE: 74 BPM | SYSTOLIC BLOOD PRESSURE: 92 MMHG | BODY MASS INDEX: 20.12 KG/M2 | DIASTOLIC BLOOD PRESSURE: 60 MMHG

## 2023-12-14 DIAGNOSIS — R10.13 EPIGASTRIC PAIN: Primary | ICD-10-CM

## 2023-12-14 DIAGNOSIS — R11.0 NAUSEA: ICD-10-CM

## 2023-12-14 PROCEDURE — 99213 OFFICE O/P EST LOW 20 MIN: CPT | Performed by: PHYSICIAN ASSISTANT

## 2023-12-14 NOTE — PATIENT INSTRUCTIONS
Dolor abdominal   LO QUE NECESITA SABER:   El dolor abdominal puede ser sordo, [place], o gavin. Usted puede sentir dolor localizado en kofi lacy área del abdomen o en todo el abdomen. El dolor puede ser causado por kofi afección brigido estreñimiento, sensibilidad o intoxicación alimentaria, infección o kofi obstrucción. Asimismo, el dolor abdominal puede deberse a kofi hernia, apendicitis o [place]. Las enfermedades del hígado, la vesícula o el riñón también pueden causar dolor abdominal. Es posible que se desconozca la causa del dolor abdominal.       INSTRUCCIONES SOBRE EL LUCIA HOSPITALARIA:   Llame al número de emergencias local (911 en los [place]) si:  Usted tiene dolor en el pecho o falta de aire. Regrese a la hugo de emergencias si:  Usted siente un dolor con pulsaciones en la parte superior del abdomen o en la parte inferior de la espalda que de repente se vuelve gareth. El dolor se localiza en la parte inferior derecha del abdomen y [place] cuando se [place]. Usted tiene fiebre por encima de los 100.4 °F (38 °C) o escalofríos. Usted tiene vómitos y no puede retener líquidos ni alimentos en el estómago. El dolor no mejora o empeora en las próximas 8 a 12 horas. Usted nota ganesh en olguin vómito o heces, o éstas tienen un aspecto negruzco y alquitranado. Olguin piel o las partes esvin de angela ojos se vuelven amarillentas. Si usted es kofi quique y presenta abundante sangrado vaginal que no es olguin menstruación. Llame a olguin médico si:  Usted siente dolor en la parte inferior de la espalda. Usted es varón y tiene [employer] testículos. Siente dolor al Anatoly. Usted tiene preguntas o inquietudes acerca de olguin condición o cuidado. Medicamentos: Es posible que usted necesite alguno de los siguientes:  Los medicamentos pueden administrarse para calmar olguin estómago o prevenir los vómitos. Puede administrarse podrían administrarse.  Pregunte al médico cómo debe gladys Monica Faye medicamento de forma rocha. Algunos medicamentos recetados para el dolor contienen acetaminofén. No tome otros medicamentos que contengan acetaminofén sin consultarlo con olguin médico. Demasiado acetaminofeno puede causar daño al hígado. Los medicamentos recetados para el dolor podrían causar estreñimiento. Pregunte a olguin médico brigido prevenir o tratar estreñimiento. [place] angela medicamentos brigido se le haya indicado. Consulte con olguin médico si usted ness que olguin medicamento no le está ayudando o si presenta efectos secundarios. Infórmele al médico si usted es alérgico a algún medicamento. Mantenga kofi lista actualizada de los [place], las vitaminas y los productos herbales que harinder. Incluya los siguientes datos de los medicamentos: cantidad, frecuencia y motivo de administración. Traiga con usted la lista o los envases de las píldoras a angela citas de seguimiento. Lleve la lista de los medicamentos con usted en tesfaye de kofi emergencia. Controle o evite el dolor abdominal:  Aplique calor sobre el abdomen de 20 a 30 minutos cada 2 horas por los [employer] indiquen. El calor ayuda a disminuir el dolor y los espasmos musculares. Realice cambios en los alimentos que consuma, de ser necesario. No coma alimentos que causan dolor abdominal u otros síntomas. Ingiera comidas pequeñas, más a menudo. Los siguientes cambios también pueden ayudar:    Coma más alimentos ricos en fibra si tiene estreñimiento. Los alimentos altos en fibra incluyen frutas, verduras, alimentos de grano integral y legumbres, brigido frijoles pintos. No coma alimentos que causan gas si tiene distensión. Por ejemplo, brócoli, repollo, frijoles y bebidas carbonatadas. No consuma alimentos o bebidas que contienen sorbitol o fructosa si tiene diarrea y distensión. Jonita Kelly son jugos de frutas, dulces, mermeladas y gomas de mascar sin azúcar. No consuma alimentos altos en grasa.  Por ejemplo, comidas fritas, hamburguesas con queso, perros calientes y postres. Realice cambios en los líquidos que tome, de ser necesario. No tome líquidos que le causen dolor o lo empeoren, brigido el jugo de [place] líquidos basilio el día para mantenerse hidratado. Los siguientes cambios también pueden ayudar:    [place] suficientes líquidos para evitar la deshidratación causada por la diarrea o los vómitos. Pregunte a olguin médico sobre la cantidad de líquido que necesita gladys todos los trixie y cuáles le recomienda. Limite o no tome cafeína. La cafeína [employer], brigido la acidez o las náuseas. Limite o no consuma bebidas alcohólicas. El alcohol puede empeorar el dolor abdominal. Pregúntele a olguin médico si está maryan que usted consuma alcohol. Pregunte qué cantidad puede beber. Elisha bebida de alcohol equivale a 12 onzas de cerveza, ½ onza de licor o 5 onzas de vino. Lleve un registro diario del dolor abdominal. Un diario puede ayudar a olguin médico a saber lo que está causando olguin dolor. Incluya cuándo ocurre el dolor, cuánto dura y la sensación causada por el dolor. Anote cualquier otro síntoma que tenga además del dolor abdominal. También anote lo que coma y cualquier síntoma que tenga después de comer. Controle el estrés. El estrés puede causar dolor abdominal. Olguin médico puede recomendarle técnicas de relajación y ejercicios de respiración profunda para ayudar a disminuir el estrés. Olguin médico puede recomendarle que hable con alguien sobre olguin estrés o ansiedad, brigido un consejero o un amigo. Duerma lo suficiente. Realice actividad física con regularidad. No fume. La nicotina y otros químicos en los cigarrillos pueden dañarle el esófago y [place]. Pida información a olguin médico si usted actualmente fuma y necesita ayuda para dejar de fumar. Los cigarrillos electrónicos o el tabaco sin humo igualmente contienen nicotina. Consulte con olguin médico antes de [employer].     Acuda a la consulta de control con olguin médico según las indicaciones: Anote angela preguntas para que se acuerde de hacerlas basilio angela visitas. © Copyright [facility] 2023 Information is for End User's use only and may not be sold, redistributed or otherwise used for commercial purposes. Esta información es sólo para uso en educación. Olguin intención no es darle un consejo médico sobre enfermedades o tratamientos. Colsulte con olguin [place] farmacéutico antes de seguir cualquier régimen médico para saber si es seguro y efectivo para usted.

## 2023-12-14 NOTE — PROGRESS NOTES
[facility]        NAME: Danny Sanford is a 33 y.o. female  : 1995    MRN: [id number]  DATE: 2023  TIME: 9:34 AM    Assessment and Plan   Epigastric pain [R10.13]  1. Epigastric pain        2. Nausea              Patient Instructions     Patient Instructions   Dolor abdominal   LO QUE NECESITA SABER:   El dolor abdominal puede ser sordo, [place], o gavin. Usted puede sentir dolor localizado en kofi lacy área del abdomen o en todo el abdomen. El dolor puede ser causado por kofi afección brigido estreñimiento, sensibilidad o intoxicación alimentaria, infección o kofi obstrucción. Asimismo, el dolor abdominal puede deberse a kofi hernia, apendicitis o [place]ulan Becket. Las enfermedades del hígado, la vesícula o el riñón también pueden causar dolor abdominal. Es posible que se desconozca la causa del dolor abdominal.       INSTRUCCIONES SOBRE EL LUCIA HOSPITALARIA:   Llame al número de emergencias local (911 en los [place]) si:  Usted tiene dolor en el pecho o falta de aire. Regrese a la hugo de emergencias si:  Usted siente un dolor con pulsaciones en la parte superior del abdomen o en la parte inferior de la espalda que de repente se vuelve gareth. El dolor se localiza en la parte inferior derecha del abdomen y [place] cuando se [place]. Usted tiene fiebre por encima de los 100.4 °F (38 °C) o escalofríos. Usted tiene vómitos y no puede retener líquidos ni alimentos en el estómago. El dolor no mejora o empeora en las próximas 8 a 12 horas. Usted nota ganesh en olguin vómito o heces, o éstas tienen un aspecto negruzco y alquitranado. Olguin piel o las partes esvin de angela ojos se vuelven amarillentas. Si usted es kofi quique y presenta abundante sangrado vaginal que no es olguin menstruación. Llame a olguin médico si:  Usted siente dolor en la parte inferior de la espalda. Usted es varón y tiene [employer] testículos. Siente dolor al Nelson-Shanita.     Usted tiene preguntas o inquietudes acerca de olguin condición o cuidado. Medicamentos: Es posible que usted necesite alguno de los siguientes:  Los medicamentos pueden administrarse para calmar olguin estómago o prevenir los vómitos. Puede administrarse podrían administrarse. Pregunte al médico cómo debe gladys amanda medicamento de forma rocha. Algunos medicamentos recetados para el dolor contienen acetaminofén. No tome otros medicamentos que contengan acetaminofén sin consultarlo con olguin médico. Demasiado acetaminofeno puede causar daño al hígado. Los medicamentos recetados para el dolor podrían causar estreñimiento. Pregunte a olguin médico brigido prevenir o tratar estreñimiento. [place] angela medicamentos brigido se le haya indicado. Consulte con olguin médico si usted ness que olguin medicamento no le está ayudando o si presenta efectos secundarios. Infórmele al médico si usted es alérgico a algún medicamento. Mantenga kofi lista actualizada de los [place], las vitaminas y los productos herbales que harinder. Incluya los siguientes datos de los medicamentos: cantidad, frecuencia y motivo de administración. Traiga con usted la lista o los envases de las píldoras a angela citas de seguimiento. Lleve la lista de los medicamentos con usted en tesfaye de kofi emergencia. Controle o evite el dolor abdominal:  Aplique calor sobre el abdomen de 20 a 30 minutos cada 2 horas por los [employer] indiquen. El calor ayuda a disminuir el dolor y los espasmos musculares. Realice cambios en los alimentos que consuma, de ser necesario. No coma alimentos que causan dolor abdominal u otros síntomas. Ingiera comidas pequeñas, más a menudo. Los siguientes cambios también pueden ayudar:    Coma más alimentos ricos en fibra si tiene estreñimiento. Los alimentos altos en fibra incluyen frutas, verduras, alimentos de grano integral y legumbres, brigido frijoles pintos. No coma alimentos que causan gas si tiene distensión.  Por ejemplo, brócoli, repollo, frijoles y bebidas carbonatadas. No consuma alimentos o bebidas que contienen sorbitol o fructosa si tiene diarrea y distensión. Nanetta Shan son jugos de frutas, dulces, mermeladas y gomas de mascar sin azúcar. No consuma alimentos altos en grasa. Por ejemplo, comidas fritas, hamburguesas con queso, perros calientes y postres. Realice cambios en los líquidos que tome, de ser necesario. No tome líquidos que le causen dolor o lo empeoren, brigido el jugo de [place]. Hauser líquidos basilio el día para mantenerse hidratado. Los siguientes cambios también pueden ayudar:    [employer]ity [employer] suficientes líquidos para evitar la deshidratación causada por la diarrea o los vómitos. Pregunte a olguin médico sobre la cantidad de líquido que necesita gladys todos los trixie y cuáles le recomienda. Limite o no tome cafeína. La cafeína [employer], brigido la acidez o las náuseas. Limite o no consuma bebidas alcohólicas. El alcohol puede empeorar el dolor abdominal. Pregúntele a olguin médico si está maryan que usted consuma alcohol. Pregunte qué cantidad puede beber. Elisha bebida de alcohol equivale a 12 onzas de cerveza, ½ onza de licor o 5 onzas de vino. Lleve un registro diario del dolor abdominal. Un diario puede ayudar a olguin médico a saber lo que está causando olguin dolor. Incluya cuándo ocurre el dolor, cuánto dura y la sensación causada por el dolor. Anote cualquier otro síntoma que tenga además del dolor abdominal. También anote lo que coma y cualquier síntoma que tenga después de comer. Controle el estrés. El estrés puede causar dolor abdominal. Olguin médico puede recomendarle técnicas de relajación y ejercicios de respiración profunda para ayudar a disminuir el estrés. Olguin médico puede recomendarle que hable con alguien sobre olguin estrés o ansiedad, brigido un consejero o un amigo. Duerma lo suficiente. Realice actividad física con regularidad. No fume.  La nicotina y otros químicos en los cigarrillos pueden dañarle el esófago y [place] estómago. Pida información a olguin médico si usted actualmente fuma y necesita ayuda para dejar de fumar. Los cigarrillos electrónicos o el tabaco sin humo igualmente contienen nicotina. Consulte con olguin médico antes de [employer]. Acuda a la consulta de control con olguin médico según las indicaciones: Anote angela preguntas para que se acuerde de hacerlas basilio angela visitas. © Copyright [employer] 2023 Information is for End User's use only and may not be sold, redistributed or otherwise used for commercial purposes. Esta información es sólo para uso en educación. Olguin intención no es darle un consejo médico sobre enfermedades o tratamientos. Colsulte con olguin Larence Chapman farmacéutico antes de seguir cualquier régimen médico para saber si es seguro y efectivo para usted. Follow up with PCP in 3-5 days. Proceed to  ER if symptoms worsen. Chief Complaint     Chief Complaint   Patient presents with    Abdominal Pain    Vomiting         History of Present Illness       The patient presents the clinic for epigastric pain that started yesterday. She states that the symptoms started suddenly. She complains of burning pain located in the epigastric area that seems to be worse with eating. She states that she vomited twice yesterday. She denies associated diarrhea, urinary symptoms, fevers, or chills. She also denies recent URI symptoms. She did not take anything for her symptoms. Review of Systems   Review of Systems   Constitutional:  Negative for chills and fever. HENT:  Negative for ear pain and sore throat. Eyes:  Negative for pain and visual disturbance. Respiratory:  Negative for cough and shortness of breath. Cardiovascular:  Negative for chest pain and palpitations. Gastrointestinal:  Positive for abdominal pain, nausea and vomiting. Negative for abdominal distention, anal bleeding, blood in stool and diarrhea. Genitourinary:  Negative for dysuria and hematuria. Musculoskeletal:  Negative for arthralgias and back pain. Skin:  Negative for color change and rash. Neurological:  Negative for seizures and syncope. All other systems reviewed and are negative. Current Medications       Current Outpatient Medications:     loperamide (IMODIUM) 2 mg capsule, Take 1 capsule (2 mg total) by mouth 4 (four) times a day as needed for diarrhea (Patient not taking: Reported on 11/9/2023), Disp: 8 capsule, Rfl: 0    ondansetron (ZOFRAN) 4 mg tablet, Take 1 tablet (4 mg total) by mouth every 6 (six) hours (Patient not taking: Reported on 11/9/2023), Disp: 12 tablet, Rfl: 0    ondansetron (ZOFRAN) 4 mg tablet, Take 1 tablet (4 mg total) by mouth every 6 (six) hours (Patient not taking: Reported on 11/9/2023), Disp: 8 tablet, Rfl: 0    Current Allergies     Allergies as of 12/14/2023    (No Known Allergies)            The following portions of the patient's history were reviewed and updated as appropriate: allergies, current medications, past family history, past medical history, past social history, past surgical history and problem list.     Past Medical History:   Diagnosis Date    Abnormal Pap smear of cervix     12/2022 LSIL pap    Asthma     Epilepsy ([address])     last seizure 7/2022, no meds since 7/2022       Past Surgical History:   Procedure Laterality Date    TUBAL LIGATION Bilateral 04/2017       Family History   Problem Relation Age of Onset    Breast cancer Neg Hx     Colon cancer Neg Hx     Ovarian cancer Neg Hx     Cancer Neg Hx          Medications have been verified. Objective   BP 92/60   Pulse 74   Temp 98.6 °F (37 °C)   Wt 49.9 kg (110 lb)   LMP 11/15/2023 (Exact Date)   BMI 20.12 kg/m²        Physical Exam     Physical Exam  Constitutional:       Appearance: She is well-developed. She is not diaphoretic. HENT:      Head: Normocephalic. Eyes:      General:         Right eye: No discharge. Left eye: No discharge.       Pupils: Pupils are equal, round, and reactive to light. Neck:      Thyroid: No thyromegaly. Cardiovascular:      Rate and Rhythm: Normal rate. Heart sounds: No murmur heard. Pulmonary:      Effort: Pulmonary effort is normal. No respiratory distress. Breath sounds: No wheezing or rales. Chest:      Chest wall: No tenderness. Abdominal:      General: There is no distension. Palpations: Abdomen is soft. Tenderness: There is abdominal tenderness in the epigastric area. There is guarding and rebound. There is no right CVA tenderness or left CVA tenderness. Musculoskeletal:         General: Normal range of motion. Cervical back: Normal range of motion. Lymphadenopathy:      Cervical: No cervical adenopathy. Skin:     General: Skin is warm. Neurological:      Mental Status: She is alert and oriented to person, place, and time. The patient has signs of acute abdomen.   I suggest further evaluation

## 2023-12-14 NOTE — LETTER
December 14, 2023     Patient: Kennedi Montez   YOB: 1995   Date of Visit: 12/14/2023       To Whom it May Concern:    Kennedi Montez was seen in my clinic on 12/14/2023. She was sent to the ER for further evaluation. If you have any questions or concerns, please don't hesitate to call.          Sincerely,          Compa Culver PA-C        CC: No Recipients

## 2024-02-01 ENCOUNTER — TELEPHONE (OUTPATIENT)
Dept: OBGYN CLINIC | Facility: CLINIC | Age: 29
End: 2024-02-01

## 2024-02-22 ENCOUNTER — TELEPHONE (OUTPATIENT)
Dept: OBGYN CLINIC | Facility: CLINIC | Age: 29
End: 2024-02-22

## 2024-02-29 ENCOUNTER — OFFICE VISIT (OUTPATIENT)
Dept: URGENT CARE | Facility: CLINIC | Age: 29
End: 2024-02-29
Payer: COMMERCIAL

## 2024-02-29 VITALS
TEMPERATURE: 98.5 F | OXYGEN SATURATION: 96 % | BODY MASS INDEX: 21.22 KG/M2 | SYSTOLIC BLOOD PRESSURE: 107 MMHG | DIASTOLIC BLOOD PRESSURE: 58 MMHG | WEIGHT: 116 LBS | HEART RATE: 86 BPM | RESPIRATION RATE: 18 BRPM

## 2024-02-29 DIAGNOSIS — R11.2 NAUSEA AND VOMITING, UNSPECIFIED VOMITING TYPE: Primary | ICD-10-CM

## 2024-02-29 DIAGNOSIS — R19.7 DIARRHEA, UNSPECIFIED TYPE: ICD-10-CM

## 2024-02-29 PROCEDURE — 99213 OFFICE O/P EST LOW 20 MIN: CPT | Performed by: PHYSICIAN ASSISTANT

## 2024-02-29 RX ORDER — ONDANSETRON HYDROCHLORIDE 8 MG/1
8 TABLET, FILM COATED ORAL EVERY 8 HOURS PRN
Qty: 20 TABLET | Refills: 0 | Status: SHIPPED | OUTPATIENT
Start: 2024-02-29

## 2024-02-29 NOTE — PROGRESS NOTES
[facility]        NAME: Carlie Dunne is a 28 y.o. female  : 1995    MRN: [id number]  DATE: 2024  TIME: 1:18 PM    Assessment and Plan   Nausea and vomiting, unspecified vomiting type [R11.2]  1. Nausea and vomiting, unspecified vomiting type  ondansetron (ZOFRAN) 8 mg tablet      2. Diarrhea, unspecified type              Patient Instructions     Take zofran as prescribed  Fluids (pedialyte) and rest  Broths and clear soups  BRAT diet (bananas, rice, applesauce, and toast)  Progress to normal diet as tolerated  Avoid dairy while symptoms persist  Tylenol for pain/fever  Follow up with PCP in 3-5 days.  Proceed to  ER if symptoms worsen.    If tests have been performed at [facility], our office will contact you with results if changes need to be made to the care plan discussed with you at the visit.  [facility]    Disinfect common household surfaces, do not share drinks, clean dishes in hot soap and water ( is best), and avoid preparing food for an additional week. Virus may continue to spread after symptoms have resolved.       Chief Complaint     Chief Complaint   Patient presents with    Abdominal Pain    Diarrhea    Vomiting    Generalized Body Aches         History of Present Illness       Contacts with similar symptoms.     Vomiting   This is a new problem. The current episode started yesterday. The problem occurs 2 to 4 times per day. There has been no fever. Associated symptoms include abdominal pain (generalized cramping), chills, diarrhea (approximately 10) and myalgias. Pertinent negatives include no chest pain, coughing, fever or headaches. Risk factors include ill contacts. She has tried increased fluids for the symptoms.       Review of Systems   Review of Systems   Constitutional:  Positive for appetite change, chills and fatigue. Negative for fever.   HENT:  Negative for congestion, ear discharge, ear pain,  hearing loss, postnasal drip, rhinorrhea, sinus pressure, sinus pain, sore throat, tinnitus and trouble swallowing.    Respiratory:  Negative for cough and shortness of breath.    Cardiovascular:  Negative for chest pain and palpitations.   Gastrointestinal:  Positive for abdominal pain (generalized cramping), diarrhea (approximately 10), nausea and vomiting. Negative for constipation.   Musculoskeletal:  Positive for myalgias.   Skin:  Negative for rash.   Neurological:  Negative for light-headedness and headaches.         Current Medications       Current Outpatient Medications:     ondansetron (ZOFRAN) 8 mg tablet, Take 1 tablet (8 mg total) by mouth every 8 (eight) hours as needed for nausea or vomiting, Disp: 20 tablet, Rfl: 0    loperamide (IMODIUM) 2 mg capsule, Take 1 capsule (2 mg total) by mouth 4 (four) times a day as needed for diarrhea (Patient not taking: Reported on 11/9/2023), Disp: 8 capsule, Rfl: 0    ondansetron (ZOFRAN) 4 mg tablet, Take 1 tablet (4 mg total) by mouth every 6 (six) hours (Patient not taking: Reported on 11/9/2023), Disp: 12 tablet, Rfl: 0    ondansetron (ZOFRAN) 4 mg tablet, Take 1 tablet (4 mg total) by mouth every 6 (six) hours (Patient not taking: Reported on 11/9/2023), Disp: 8 tablet, Rfl: 0    Current Allergies     Allergies as of 02/29/2024    (No Known Allergies)            The following portions of the patient's history were reviewed and updated as appropriate: allergies, current medications, past family history, past medical history, past social history, past surgical history and problem list.     Past Medical History:   Diagnosis Date    Abnormal Pap smear of cervix     12/2022 LSIL pap    Asthma     Epilepsy (HCC)     last seizure 7/2022, no meds since 7/2022       Past Surgical History:   Procedure Laterality Date    TUBAL LIGATION Bilateral 04/2017       Family History   Problem Relation Age of Onset    Breast cancer Neg Hx     Colon cancer Neg Hx     Ovarian cancer  Neg Hx     Cancer Neg Hx          Medications have been verified.        Objective   /58   Pulse 86   Temp 98.5 °F (36.9 °C)   Resp 18   Wt 52.6 kg (116 lb)   LMP 02/10/2024 (Exact Date)   SpO2 96%   BMI 21.22 kg/m²   Patient's last menstrual period was 02/10/2024 (exact date).       Physical Exam     Physical Exam  Constitutional:       General: She is not in acute distress.     Appearance: She is well-developed. She is not diaphoretic.   HENT:      Right Ear: Tympanic membrane, ear canal and external ear normal.      Left Ear: Tympanic membrane, ear canal and external ear normal.      Mouth/Throat:      Mouth: Mucous membranes are moist.      Pharynx: No oropharyngeal exudate or posterior oropharyngeal erythema.   Cardiovascular:      Rate and Rhythm: Normal rate and regular rhythm.      Heart sounds: No murmur heard.     No friction rub. No gallop.   Pulmonary:      Effort: Pulmonary effort is normal. No respiratory distress.      Breath sounds: Normal breath sounds. No wheezing, rhonchi or rales.   Abdominal:      General: Bowel sounds are normal. There is no distension.      Palpations: Abdomen is soft. There is no mass.      Tenderness: There is generalized abdominal tenderness. There is no guarding or rebound.   Lymphadenopathy:      Cervical: No cervical adenopathy.   Skin:     General: Skin is warm.   Neurological:      Mental Status: She is alert.   Psychiatric:         Behavior: Behavior normal.         Thought Content: Thought content normal.         Judgment: Judgment normal.

## 2024-02-29 NOTE — PATIENT INSTRUCTIONS
Take zofran as prescribed  Fluids (pedialyte) and rest  Broths and clear soups  BRAT diet (bananas, rice, applesauce, and toast)  Progress to normal diet as tolerated  Avoid dairy while symptoms persist  Tylenol for pain/fever  Follow up with PCP in 3-5 days.  Proceed to  ER if symptoms worsen.    If tests have been performed at [facility], our office will contact you with results if changes need to be made to the care plan discussed with you at the visit.  [facility]    Disinfect common household surfaces, do not share drinks, clean dishes in hot soap and water ( is best), and avoid preparing food for an additional week. Virus may continue to spread after symptoms have resolved.

## 2024-02-29 NOTE — LETTER
February 29, 2024     Patient: Carlie Dunne   YOB: 1995   Date of Visit: 2/29/2024       To Whom It May Concern:    It is my medical opinion that Carlie Dunne may return once symptoms have improved and has remained fever free for 24 hours without fever reducing medications.       If you have any questions or concerns, please don't hesitate to call.         Sincerely,        Kirti Miller PA-C    CC: No Recipients

## 2024-06-15 ENCOUNTER — TELEPHONE (OUTPATIENT)
Dept: EMERGENCY DEPT | Facility: HOSPITAL | Age: 29
End: 2024-06-15